# Patient Record
Sex: MALE | Race: BLACK OR AFRICAN AMERICAN | Employment: OTHER | URBAN - METROPOLITAN AREA
[De-identification: names, ages, dates, MRNs, and addresses within clinical notes are randomized per-mention and may not be internally consistent; named-entity substitution may affect disease eponyms.]

---

## 2018-01-02 ENCOUNTER — HOSPITAL ENCOUNTER (EMERGENCY)
Age: 68
Discharge: HOME | End: 2018-01-02
Attending: EMERGENCY MEDICINE
Payer: MEDICARE

## 2018-01-02 VITALS
SYSTOLIC BLOOD PRESSURE: 184 MMHG | BODY MASS INDEX: 27.63 KG/M2 | WEIGHT: 204 LBS | RESPIRATION RATE: 16 BRPM | HEART RATE: 98 BPM | TEMPERATURE: 98 F | HEIGHT: 72 IN | OXYGEN SATURATION: 95 % | DIASTOLIC BLOOD PRESSURE: 102 MMHG

## 2018-01-02 DIAGNOSIS — L73.2 HIDRADENITIS SUPPURATIVA: Primary | ICD-10-CM

## 2018-01-02 PROCEDURE — 99283 EMERGENCY DEPT VISIT LOW MDM: CPT

## 2018-01-02 PROCEDURE — 74011250637 HC RX REV CODE- 250/637: Performed by: EMERGENCY MEDICINE

## 2018-01-02 RX ORDER — CARVEDILOL 12.5 MG/1
12.5 TABLET ORAL 2 TIMES DAILY WITH MEALS
COMMUNITY

## 2018-01-02 RX ORDER — CEPHALEXIN 500 MG/1
500 CAPSULE ORAL
Status: COMPLETED | OUTPATIENT
Start: 2018-01-02 | End: 2018-01-02

## 2018-01-02 RX ORDER — SULFAMETHOXAZOLE AND TRIMETHOPRIM 800; 160 MG/1; MG/1
1 TABLET ORAL 2 TIMES DAILY
Qty: 14 TAB | Refills: 0 | Status: SHIPPED | OUTPATIENT
Start: 2018-01-02 | End: 2018-01-09

## 2018-01-02 RX ORDER — AMLODIPINE BESYLATE 10 MG/1
TABLET ORAL DAILY
COMMUNITY

## 2018-01-02 RX ORDER — METFORMIN HYDROCHLORIDE 500 MG/1
500 TABLET ORAL 2 TIMES DAILY WITH MEALS
COMMUNITY

## 2018-01-02 RX ORDER — GABAPENTIN 300 MG/1
300 CAPSULE ORAL DAILY
COMMUNITY

## 2018-01-02 RX ORDER — SULFAMETHOXAZOLE AND TRIMETHOPRIM 800; 160 MG/1; MG/1
1 TABLET ORAL
Status: COMPLETED | OUTPATIENT
Start: 2018-01-02 | End: 2018-01-02

## 2018-01-02 RX ORDER — LISINOPRIL 10 MG/1
30 TABLET ORAL DAILY
COMMUNITY
End: 2020-05-30

## 2018-01-02 RX ORDER — ATORVASTATIN CALCIUM 40 MG/1
40 TABLET, FILM COATED ORAL DAILY
COMMUNITY

## 2018-01-02 RX ORDER — CEPHALEXIN 500 MG/1
500 CAPSULE ORAL 4 TIMES DAILY
Qty: 28 CAP | Refills: 0 | Status: SHIPPED | OUTPATIENT
Start: 2018-01-02 | End: 2018-01-09

## 2018-01-02 RX ORDER — AMLODIPINE BESYLATE 10 MG/1
10 TABLET ORAL DAILY
Status: ON HOLD | COMMUNITY
End: 2020-05-25

## 2018-01-02 RX ORDER — LABETALOL 200 MG/1
200 TABLET, FILM COATED ORAL 2 TIMES DAILY
COMMUNITY

## 2018-01-02 RX ADMIN — CEPHALEXIN 500 MG: 500 CAPSULE ORAL at 07:39

## 2018-01-02 RX ADMIN — SULFAMETHOXAZOLE AND TRIMETHOPRIM 1 TABLET: 800; 160 TABLET ORAL at 07:39

## 2018-01-02 NOTE — DISCHARGE INSTRUCTIONS
Hidradenitis Suppurativa: Care Instructions  Your Care Instructions    Hidradenitis suppurativa (say \"eyc-glln-gw-NY-tus sup-ange-uh-TY-vuh\") is a skin condition that causes lumps on the skin that look like pimples or boils. The lumps are usually painful and can break open and drain blood and bad-smelling pus. The condition can come and go for many years. Treatment for this condition may includeantibiotics and other medicines. You may need surgeryto remove the lumps. Home care includes wearing loose-fitting clothes and washing the area gently. You can help prevent lumps from coming back by staying at a healthy weight and not smoking. Doctors don't know exactly how this condition starts. But they do know that something irritates and inflames the hair follicles, causing them to swell and form lumps. This skin condition can't be spread from person to person (isn't contagious). Follow-up care is a key part of your treatment and safety. Be sure to make and go to all appointments, and call your doctor if you are having problems. It's also a good idea to know your test results and keep a list of the medicines you take. How can you care for yourself at home? ?Skin care  ? · Wash the area every day with mild soap. Use your hands rather than a washcloth or sponge when you wash that part of your body. ? · Leave the affected areas uncovered when you can. If you have lumps that are draining, you can cover them with a bandage or other dressing. Put petroleum jelly (such as Vaseline) on the dressing to help keep it from sticking. ? · Wear-loose fitting clothes that don't rub against the area. Avoid activities that cause skin to rub together. ? · If you have pain, try a warm compress. Soak a towel or washcloth in warm water, wring it out, and place it on the affected skin for about 10 minutes. Medicines  ? · Be safe with medicines. Take your medicines exactly as prescribed.  Call your doctor if you think you are having a problem with your medicine. You will get more details on the specific medicines your doctor prescribes. ? · If your doctor prescribed antibiotics, take them as directed. Do not stop taking them just because you feel better. You need to take the full course of antibiotics. ? Lifestyle choices  ? · If you smoke, think about quitting. Smoking can make the condition worse. If you need help quitting, talk to your doctor about stop-smoking programs and medicines. These can increase your chances of quitting for good. ? · Stay at a healthy weight, or lose weight, by eating healthy foods and being physically active. Being overweight could make this condition worse. When should you call for help? Call your doctor now or seek immediate medical care if:  ? · You have symptoms of infection, such as:  ¨ Increased pain, swelling, warmth, or redness. ¨ Red streaks leading from the area. ¨ Pus draining from the area. ¨ A fever. ? Watch closely for changes in your health, and be sure to contact your doctor if:  ? · You do not get better as expected. Where can you learn more? Go to http://serafin-josé.info/. Enter O295 in the search box to learn more about \"Hidradenitis Suppurativa: Care Instructions. \"  Current as of: October 13, 2016  Content Version: 11.4  © 1002-0119 [employer]. Care instructions adapted under license by [employer] (which disclaims liability or warranty for this information). If you have questions about a medical condition or this instruction, always ask your healthcare professional. [place] [address] any warranty or liability for your use of this information.

## 2018-01-02 NOTE — ED PROVIDER NOTES
HPI Comments: Pt c/o 15 days of areas of skin swelling in the R axilla x 15 days. He is a diabetic. Also has some sort of \"heart failure\" for which he takes xarelto. He took his am meds about an hour ago. Evaluated at 6:15 am on a Tuesday. No loss of appetite. No CP. No nvdc. No fever/chills. A little pain with palpation of axilla. No pain at rest.  Has hx of draining from these areas but not recently. No pneed for prior I&D. L axilla is fine. Old chart reviewed: Was seen in 2014 for a foot puncture at [facility]. Patient is a 79 y.o. male presenting with skin problem. Skin Problem           Past Medical History:   Diagnosis Date    Diabetes ([address]     Hypertension        Past Surgical History:   Procedure Laterality Date    HX OTHER SURGICAL      cyst removal to back         No family history on file. Social History     Social History    Marital status: SINGLE     Spouse name: N/A    Number of children: N/A    Years of education: N/A     Occupational History    Not on file. Social History Main Topics    Smoking status: Never Smoker    Smokeless tobacco: Never Used    Alcohol use No    Drug use: No    Sexual activity: Not on file     Other Topics Concern    Not on file     Social History Narrative         ALLERGIES: Codeine    Review of Systems    Vitals:    01/02/18 0607   BP: (!) 184/102   Pulse: 98   Resp: 16   Temp: 98 °F (36.7 °C)   SpO2: 95%   Weight: 92.5 kg (204 lb)   Height: 6' (1.829 m)            Physical Exam   Constitutional: He appears well-developed and well-nourished. No distress. Eyes: Pupils are equal, round, and reactive to light. Conj pale   Neck: No tracheal deviation present. Cardiovascular: Normal rate, regular rhythm and intact distal pulses. Pulmonary/Chest: Effort normal.   Abdominal: Soft. He exhibits no distension. There is no tenderness. There is no rebound. Musculoskeletal: He exhibits no edema.    R axilla:  Looks like scars from hidradenitis. Multiple small pustules. Nothing inflamed. Nothing fluctuant. Nothing to drain. No cellulitis. Neurological: He is alert. Skin: Skin is warm and dry. He is not diaphoretic. Psychiatric: He has a normal mood and affect. [facility]  ED Course       Procedures               Plan: oral abx  Surgical referral  No need for I&D at this time.

## 2018-01-02 NOTE — ED TRIAGE NOTES
Patient presents to the emergency department reporting \"boils\" under his right arm. Patient states he thought they went away, but they came back. Patient reports he is diabetic. Patient reports he has noticed these \"boils\" for about two weeks.

## 2018-11-14 ENCOUNTER — HOSPITAL ENCOUNTER (EMERGENCY)
Age: 68
Discharge: HOME | End: 2018-11-14
Attending: EMERGENCY MEDICINE | Admitting: EMERGENCY MEDICINE
Payer: MEDICARE

## 2018-11-14 VITALS
DIASTOLIC BLOOD PRESSURE: 116 MMHG | WEIGHT: 212 LBS | RESPIRATION RATE: 18 BRPM | HEIGHT: 72 IN | OXYGEN SATURATION: 98 % | TEMPERATURE: 98.1 F | BODY MASS INDEX: 28.71 KG/M2 | HEART RATE: 87 BPM | SYSTOLIC BLOOD PRESSURE: 210 MMHG

## 2018-11-14 DIAGNOSIS — S05.01XA ABRASION OF RIGHT CORNEA, INITIAL ENCOUNTER: ICD-10-CM

## 2018-11-14 DIAGNOSIS — I10 ESSENTIAL HYPERTENSION: ICD-10-CM

## 2018-11-14 DIAGNOSIS — H57.11 ACUTE RIGHT EYE PAIN: Primary | ICD-10-CM

## 2018-11-14 PROCEDURE — 74011000250 HC RX REV CODE- 250: Performed by: EMERGENCY MEDICINE

## 2018-11-14 PROCEDURE — 74011250637 HC RX REV CODE- 250/637: Performed by: EMERGENCY MEDICINE

## 2018-11-14 PROCEDURE — 99283 EMERGENCY DEPT VISIT LOW MDM: CPT

## 2018-11-14 RX ORDER — ERYTHROMYCIN 5 MG/G
OINTMENT OPHTHALMIC
Status: COMPLETED | OUTPATIENT
Start: 2018-11-14 | End: 2018-11-14

## 2018-11-14 RX ORDER — TETRACAINE HYDROCHLORIDE 5 MG/ML
1 SOLUTION OPHTHALMIC
Status: COMPLETED | OUTPATIENT
Start: 2018-11-14 | End: 2018-11-14

## 2018-11-14 RX ADMIN — FLUORESCEIN SODIUM 1 STRIP: 0.6 STRIP OPHTHALMIC at 09:05

## 2018-11-14 RX ADMIN — ERYTHROMYCIN: 5 OINTMENT OPHTHALMIC at 09:34

## 2018-11-14 RX ADMIN — TETRACAINE HYDROCHLORIDE 1 DROP: 5 SOLUTION OPHTHALMIC at 09:05

## 2018-11-14 NOTE — ED PROVIDER NOTES
EMERGENCY DEPARTMENT HISTORY AND PHYSICAL EXAM 
 
 
Date: 11/14/2018 Patient Name: Magdalena Nickerson History of Presenting Illness Chief Complaint Patient presents with  Eye Pain  
  pt c/o rt eye pain x 2 weeks,reported might be something in my eye but not sure,pt BP high 212/115,ED provider Dr Hussein Finley made aware,pt taking BP meds,didn't took today. History Provided By: Patient HPI: Magdalena Nickerson, 76 y.o. male with PMHx significant for diabetes, HTN, presents ambulatory to the ED with cc of moderate, progressive intermittent  R eye pain for 2 weeks with associated visual disturbance. Pt reports using a cloth with a closed pin attached ~ 2 weeks ago to wipe his eye and is concern closed pin may have scratched his eye. He also mentions increased tears since onset of symptoms. He reports hx of seeking tx from optometrist, Dr. Aquilino Hsu for corrective lenses, but denies any recent eye assessments. He conveys compliance of medication regimen, but denies endorsing BP medication this morning. He denies endorsing any medication to relieve current symptoms. Pt denies any exacerbating and alleviating factors. Pt specifically denies any signs of fever, chills, N/V/D, HA, weakness, numbness, cough, CP, SOB, abdominal pain, back pain, and any other associated symptoms. There are no other complaints, changes, or physical findings at this time. PCP: Bal Hughes MD 
 
Current Facility-Administered Medications Medication Dose Route Frequency Provider Last Rate Last Dose  erythromycin (ILOTYCIN) 5 mg/gram (0.5 %) ophthalmic ointment   Right Eye NOW Ellie Wiggins, DO      
 
Current Outpatient Medications Medication Sig Dispense Refill  labetalol (NORMODYNE) 200 mg tablet Take 200 mg by mouth two (2) times a day.  carvedilol (COREG) 12.5 mg tablet Take 12.5 mg by mouth two (2) times daily (with meals).  amLODIPine (NORVASC) 10 mg tablet Take 10 mg by mouth daily.  amLODIPine (NORVASC) 10 mg tablet Take  by mouth daily.  gabapentin (NEURONTIN) 300 mg capsule Take 300 mg by mouth daily.  lisinopril (PRINIVIL, ZESTRIL) 10 mg tablet Take 5 mg by mouth daily.  atorvastatin (LIPITOR) 40 mg tablet Take 40 mg by mouth daily.  rivaroxaban (XARELTO) 20 mg tab tablet Take 20 mg by mouth daily.  metFORMIN (GLUCOPHAGE) 500 mg tablet Take 500 mg by mouth two (2) times daily (with meals).  acetaminophen (TYLENOL EXTRA STRENGTH) 500 mg tablet Take 1 Tab by mouth every six (6) hours as needed for Pain. 20 Tab 0  METOPROLOL TARTRATE PO Take  by mouth two (2) times a day. Indications: Unknown dosage Past History Past Medical History: 
Past Medical History:  
Diagnosis Date  Diabetes ([address]  Hypertension Past Surgical History: 
Past Surgical History:  
Procedure Laterality Date  HX OTHER SURGICAL    
 cyst removal to back Family History: 
History reviewed. No pertinent family history. Social History: 
Social History Tobacco Use  Smoking status: Never Smoker  Smokeless tobacco: Never Used Substance Use Topics  Alcohol use: No  
 Drug use: No  
 
 
Allergies: Allergies Allergen Reactions  Codeine Other (comments) \"cause confusion and seeing double\" Review of Systems Review of Systems Constitutional: Negative for chills and fever. HENT: Negative for congestion and sore throat. Eyes: Positive for pain and visual disturbance. Respiratory: Negative for cough and shortness of breath. Cardiovascular: Negative for chest pain and leg swelling. Gastrointestinal: Negative for abdominal pain, blood in stool, diarrhea and nausea. Endocrine: Negative for polyuria. Genitourinary: Negative for dysuria and testicular pain. Musculoskeletal: Negative for arthralgias, joint swelling and myalgias. Skin: Negative for rash. Allergic/Immunologic: Negative for immunocompromised state. Neurological: Negative for weakness and headaches. Hematological: Does not bruise/bleed easily. Psychiatric/Behavioral: Negative for confusion. Physical Exam  
Physical Exam  
Constitutional: He is oriented to person, place, and time. He appears well-developed and well-nourished. HENT:  
Head: Normocephalic and atraumatic. Moist mucous membranes Eyes: Conjunctivae are normal. Pupils are equal, round, and reactive to light. Lids are everted and swept, no foreign bodies found. Right eye exhibits no discharge. No foreign body present in the right eye. Left eye exhibits no discharge. Slit lamp exam: The right eye shows fluorescein uptake. The right eye shows no foreign body. Fluorescein dye uptake running horizontally and in front of visual axis. Pressure values L eye: 16, 25,21,19 Pressure values R eye: 20,21,15 Neck: Normal range of motion. Neck supple. No tracheal deviation present. Cardiovascular: Normal rate, regular rhythm and normal heart sounds. No murmur heard. Pulmonary/Chest: Effort normal and breath sounds normal. No respiratory distress. He has no wheezes. He has no rales. Abdominal: Soft. Bowel sounds are normal. There is no tenderness. There is no rebound and no guarding. Musculoskeletal: Normal range of motion. He exhibits no edema, tenderness or deformity. Neurological: He is alert and oriented to person, place, and time. Skin: Skin is warm and dry. No rash noted. No erythema. Psychiatric: His behavior is normal.  
Nursing note and vitals reviewed. Diagnostic Study Results Labs - No results found for this or any previous visit (from the past 12 hour(s)). Radiologic Studies - None Medical Decision Making I am the first provider for this patient. I reviewed the vital signs, available nursing notes, past medical history, past surgical history, family history and social history. Vital Signs-Reviewed the patient's vital signs. Patient Vitals for the past 12 hrs: 
 Temp Pulse Resp BP SpO2  
11/14/18 0849 98.1 °F (36.7 °C) 87 18 (!) 212/115 98 % Pulse Oximetry Analysis - 98% on RA Records Reviewed: Nursing Notes, Old Medical Records, Previous Radiology Studies and Previous Laboratory Studies Provider Notes (Medical Decision Making): DDx: Corneal abrasion, corneal ulcer. No evidence of globe rupture or glaucoma. ED Course:  
Initial assessment performed. The patients presenting problems have been discussed, and they are in agreement with the care plan formulated and outlined with them. I have encouraged them to ask questions as they arise throughout their visit. Progress Note: 
9:17 AM 
Pt will f/u with Dr. Minda Alvarez this morning for a close f/u appointment. Written by DALE Dowibe, as dictated by Randy Galvez DO. Progress Note: 
9:27 AM 
Pt reports he did not take BP medication today. He further states he needs to go home and take medication. Written by DALE Dow, as dictated by Randy Galvez DO. Critical Care Time:  
0 minutes Disposition: 
Discharge Note: 
9:33 AM 
The pt is ready for discharge. The pt's signs, symptoms, diagnosis, and discharge instructions have been discussed and pt has conveyed their understanding. The pt is to follow up as recommended or return to ER should their symptoms worsen. Plan has been discussed and pt is in agreement. PLAN: 
1. Current Discharge Medication List  
  
 
2. Follow-up Information Follow up With Specialties Details Why Contact Info Shona Betancur MD Ophthalmology Call today for next available appointment. Let them know you were discharged from the ED with right eye pain [address] [place] [address] 
[phone] Afia Heath MD Internal Medicine Schedule an appointment as soon as possible for a visit regarding your blood pressure.  please make sure you take your blood pressure medicines. [address] [address] 
[phone] Return to ED if worse Diagnosis Clinical Impression: 1. Acute right eye pain 2. Abrasion of right cornea, initial encounter 3. Essential hypertension Attestations: This note is prepared by Maria D Polanco, acting as Scribe for [employer] Data CorporationDO. [employer], DO: The scribe's documentation has been prepared under my direction and personally reviewed by me in its entirety. I confirm that the note above accurately reflects all work, treatment, procedures, and medical decision making performed by me

## 2018-11-14 NOTE — ED NOTES
Patient given printed discharge instructions and np script(s). Pt verbalized understanding of instructions and script(s). Patient verbalized importance of following up with eye doctor and pcp. Patient alert and oriented, in no acute distress, ambulatory with self.

## 2018-11-14 NOTE — ED NOTES
Emergency Department Nursing Plan of Care The Nursing Plan of Care is developed from the Nursing assessment and Emergency Department Attending provider initial evaluation. The plan of care may be reviewed in the ED Provider note. The Plan of Care was developed with the following considerations:  
Patient / Family readiness to learn indicated by:verbalized understanding Persons(s) to be included in education: patient Barriers to Learning/Limitations:No 
 
Signed Jessie Armando 11/14/2018   8:57 AM

## 2018-11-14 NOTE — DISCHARGE INSTRUCTIONS
Please go take your blood pressure medicines as soon as possible. High Blood Pressure: Care Instructions  Your Care Instructions    If your blood pressure is usually above 130/80, you have high blood pressure, or hypertension. That means the top number is 130 or higher or the bottom number is 80 or higher, or both. Despite what a lot of people think, high blood pressure usually doesn't cause headaches or make you feel dizzy or lightheaded. It usually has no symptoms. But it does increase your risk for heart attack, stroke, and kidney or eye damage. The higher your blood pressure, the more your risk increases. Your doctor will give you a goal for your blood pressure. Your goal will be based on your health and your age. Lifestyle changes, such as eating healthy and being active, are always important to help lower blood pressure. You might also take medicine to reach your blood pressure goal.  Follow-up care is a key part of your treatment and safety. Be sure to make and go to all appointments, and call your doctor if you are having problems. It's also a good idea to know your test results and keep a list of the medicines you take. How can you care for yourself at home? Medical treatment  · If you stop taking your medicine, your blood pressure will go back up. You may take one or more types of medicine to lower your blood pressure. Be safe with medicines. Take your medicine exactly as prescribed. Call your doctor if you think you are having a problem with your medicine. · Talk to your doctor before you start taking aspirin every day. Aspirin can help certain people lower their risk of a heart attack or stroke. But taking aspirin isn't right for everyone, because it can cause serious bleeding. · See your doctor regularly. You may need to see the doctor more often at first or until your blood pressure comes down.   · If you are taking blood pressure medicine, talk to your doctor before you take decongestants or anti-inflammatory medicine, such as ibuprofen. Some of these medicines can raise blood pressure. · Learn how to check your blood pressure at home. Lifestyle changes  · Stay at a healthy weight. This is especially important if you put on weight around the waist. Losing even 10 pounds can help you lower your blood pressure. · If your doctor recommends it, get more exercise. Walking is a good choice. Bit by bit, increase the amount you walk every day. Try for at least 30 minutes on most days of the week. You also may want to swim, bike, or do other activities. · Avoid or limit alcohol. Talk to your doctor about whether you can drink any alcohol. · Try to limit how much sodium you eat to less than 2,300 milligrams (mg) a day. Your doctor may ask you to try to eat less than 1,500 mg a day. · Eat plenty of fruits (such as bananas and oranges), vegetables, legumes, whole grains, and low-fat dairy products. · Lower the amount of saturated fat in your diet. Saturated fat is found in animal products such as milk, cheese, and meat. Limiting these foods may help you lose weight and also lower your risk for heart disease. · Do not smoke. Smoking increases your risk for heart attack and stroke. If you need help quitting, talk to your doctor about stop-smoking programs and medicines. These can increase your chances of quitting for good. When should you call for help? Call 911 anytime you think you may need emergency care. This may mean having symptoms that suggest that your blood pressure is causing a serious heart or blood vessel problem. Your blood pressure may be over 180/120.   For example, call 911 if:    · You have symptoms of a heart attack. These may include:  ? Chest pain or pressure, or a strange feeling in the chest.  ? Sweating. ? Shortness of breath. ? Nausea or vomiting.   ? Pain, pressure, or a strange feeling in the back, neck, jaw, or upper belly or in one or both shoulders or arms.  ? Lightheadedness or sudden weakness. ? A fast or irregular heartbeat.     · You have symptoms of a stroke. These may include:  ? Sudden numbness, tingling, weakness, or loss of movement in your face, arm, or leg, especially on only one side of your body. ? Sudden vision changes. ? Sudden trouble speaking. ? Sudden confusion or trouble understanding simple statements. ? Sudden problems with walking or balance. ? A sudden, severe headache that is different from past headaches.     · You have severe back or belly pain.    Do not wait until your blood pressure comes down on its own. Get help right away.   Call your doctor now or seek immediate care if:    · Your blood pressure is much higher than normal (such as 180/120 or higher), but you don't have symptoms.     · You think high blood pressure is causing symptoms, such as:  ? Severe headache.  ? Blurry vision.    Watch closely for changes in your health, and be sure to contact your doctor if:    · Your blood pressure measures higher than your doctor recommends at least 2 times. That means the top number is higher or the bottom number is higher, or both.     · You think you may be having side effects from your blood pressure medicine. Where can you learn more? Go to http://serafin-josé.info/. Enter Q449 in the search box to learn more about \"High Blood Pressure: Care Instructions. \"  Current as of: December 6, 2017  Content Version: 11.8  © 0635-1816 [employer]. Care instructions adapted under license by [employer] (which disclaims liability or warranty for this information). If you have questions about a medical condition or this instruction, always ask your healthcare professional. [place] [address] any warranty or liability for your use of this information.            Corneal Scratches: Care Instructions  Your Care Instructions    The cornea is the clear surface that covers the front of the eye. When a speck of dirt, a wood chip, an insect, or another object flies into your eye, it can cause a painful scratch on the cornea. Wearing contact lenses too long or rubbing your eyes can also scratch the cornea. Small scratches usually heal in a day or two. Deeper scratches may take longer. If you have had a foreign object removed from your eye or you have a corneal scratch, you will need to watch for infection and vision problems while your eye heals. Follow-up care is a key part of your treatment and safety. Be sure to make and go to all appointments, and call your doctor if you are having problems. It's also a good idea to know your test results and keep a list of the medicines you take. How can you care for yourself at home? · The doctor probably used a medicine during your exam to numb your eye. When it wears off in 30 to 60 minutes, your eye pain may come back. Take pain medicines exactly as directed. ? If the doctor gave you a prescription medicine for pain, take it as prescribed. ? If you are not taking a prescription pain medicine, ask your doctor if you can take an over-the-counter medicine. ? Do not take two or more pain medicines at the same time unless the doctor told you to. Many pain medicines have acetaminophen, which is Tylenol. Too much acetaminophen (Tylenol) can be harmful. · Do not rub your injured eye. Rubbing can make it worse. · Use the prescribed eyedrops or ointment as directed. Be sure the dropper or bottle tip is clean. To put in eyedrops or ointment:  ? Tilt your head back, and pull your lower eyelid down with one finger. ? Drop or squirt the medicine inside the lower lid. ? Close your eye for 30 to 60 seconds to let the drops or ointment move around. ? Do not touch the ointment or dropper tip to your eyelashes or any other surface. · Do not use your contact lens in your hurt eye until your doctor says you can.  Also, do not wear eye makeup until your eye has healed. · Do not drive if you have blurred vision. · Bright light may hurt. Sunglasses can help. · To prevent eye injuries in the future, wear safety glasses or goggles when you work with machines or tools, mow the lawn, or ride a bike or motorcycle. When should you call for help? Call your doctor now or seek immediate medical care if:    · You have signs of an eye infection, such as:  ? Pus or thick discharge coming from the eye.  ? Redness or swelling around the eye.  ? A fever.     · You have new or worse eye pain.     · You have vision changes.     · It feels like there is something in your eye.     · Light hurts your eye.    Watch closely for changes in your health, and be sure to contact your doctor if:    · You do not get better as expected. Where can you learn more? Go to http://esrafin-josé.info/. Enter H662 in the search box to learn more about \"Corneal Scratches: Care Instructions. \"  Current as of: December 3, 2017  Content Version: 11.8  © 6034-6668 [employer]. Care instructions adapted under license by [employer] (which disclaims liability or warranty for this information). If you have questions about a medical condition or this instruction, always ask your healthcare professional. [place] [address] any warranty or liability for your use of this information.

## 2018-11-14 NOTE — ED NOTES
Provider notified of pt's high bp. Provider informed pt that he is to go home and take his normal daily meds.

## 2018-12-14 ENCOUNTER — HOSPITAL ENCOUNTER (EMERGENCY)
Age: 68
Discharge: HOME | End: 2018-12-14
Attending: EMERGENCY MEDICINE
Payer: MEDICARE

## 2018-12-14 VITALS
OXYGEN SATURATION: 100 % | DIASTOLIC BLOOD PRESSURE: 117 MMHG | SYSTOLIC BLOOD PRESSURE: 185 MMHG | RESPIRATION RATE: 22 BRPM | HEART RATE: 99 BPM | TEMPERATURE: 98.3 F

## 2018-12-14 DIAGNOSIS — M54.31 SCIATICA OF RIGHT SIDE: Primary | ICD-10-CM

## 2018-12-14 PROCEDURE — 99281 EMR DPT VST MAYX REQ PHY/QHP: CPT

## 2018-12-14 RX ORDER — CYCLOBENZAPRINE HCL 10 MG
10 TABLET ORAL
Qty: 15 TAB | Refills: 0 | Status: SHIPPED | OUTPATIENT
Start: 2018-12-14

## 2018-12-14 RX ORDER — PREDNISONE 20 MG/1
60 TABLET ORAL DAILY
Qty: 15 TAB | Refills: 0 | Status: SHIPPED | OUTPATIENT
Start: 2018-12-14 | End: 2018-12-19

## 2018-12-14 NOTE — ED PROVIDER NOTES
65yo M with pain. Pain in mostly in R lower back and radiates down R leg. Pain present for 5 days. No fall or injury. AT home pt just tried to find comfortable position. No pain medication. Was at [facility] 2 days ago. Gave patient pain pills. Has had symptoms similar to this for long time. Past Medical History:   Diagnosis Date    Diabetes ([facility] [address]     Hypertension        Past Surgical History:   Procedure Laterality Date    HX OTHER SURGICAL      cyst removal to back         No family history on file. Social History     Socioeconomic History    Marital status: SINGLE     Spouse name: Not on file    Number of children: Not on file    Years of education: Not on file    Highest education level: Not on file   Social Needs    Financial resource strain: Not on file    Food insecurity - worry: Not on file    Food insecurity - inability: Not on file    Transportation needs - medical: Not on file   [place] needs - non-medical: Not on file   Occupational History    Not on file   Tobacco Use    Smoking status: Never Smoker    Smokeless tobacco: Never Used   Substance and Sexual Activity    Alcohol use: No    Drug use: No    Sexual activity: Not on file   Other Topics Concern    Not on file   Social History Narrative    Not on file         ALLERGIES: Codeine    Review of Systems   Constitutional: Negative for diaphoresis and fever. HENT: Negative for facial swelling. Eyes: Negative for visual disturbance. Respiratory: Negative for cough. Cardiovascular: Negative for chest pain. Gastrointestinal: Negative for abdominal pain. Genitourinary: Negative for dysuria. Musculoskeletal: Negative for joint swelling. Skin: Negative for rash. Neurological: Negative for headaches. Hematological: Negative for adenopathy. Psychiatric/Behavioral: Negative for suicidal ideas.        Vitals:    12/14/18 1427   Pulse: 99   SpO2: 100%            Physical Exam   Constitutional: He is oriented to person, place, and time. He appears well-developed and well-nourished. No distress. HENT:   Head: Normocephalic and atraumatic. Mouth/Throat: Oropharynx is clear and moist.   Eyes: Pupils are equal, round, and reactive to light. Neck: Normal range of motion. Neck supple. Cardiovascular: Normal rate, regular rhythm, normal heart sounds and intact distal pulses. Pulmonary/Chest: Effort normal and breath sounds normal. No respiratory distress. Abdominal: Soft. Bowel sounds are normal. He exhibits no distension. There is no tenderness. Musculoskeletal: Normal range of motion. He exhibits no edema. Neurological: He is alert and oriented to person, place, and time. Skin: Skin is warm and dry. Nursing note and vitals reviewed. MDM  Number of Diagnoses or Management Options  Sciatica of right side:   Diagnosis management comments: A:  Symptoms suggestive of sciatica. VS stable. Has prescription for pain medication from [facility] that he hasn't filled. Stable for discharge with prednisone and flexeril.          Procedures

## 2018-12-14 NOTE — DISCHARGE INSTRUCTIONS
Back Pain: Care Instructions  Your Care Instructions    Back pain has many possible causes. It is often related to problems with muscles and ligaments of the back. It may also be related to problems with the nerves, discs, or bones of the back. Moving, lifting, standing, sitting, or sleeping in an awkward way can strain the back. Sometimes you don't notice the injury until later. Arthritis is another common cause of back pain. Although it may hurt a lot, back pain usually improves on its own within several weeks. Most people recover in 12 weeks or less. Using good home treatment and being careful not to stress your back can help you feel better sooner. Follow-up care is a key part of your treatment and safety. Be sure to make and go to all appointments, and call your doctor if you are having problems. It's also a good idea to know your test results and keep a list of the medicines you take. How can you care for yourself at home? · Sit or lie in positions that are most comfortable and reduce your pain. Try one of these positions when you lie down:  ? Lie on your back with your knees bent and supported by large pillows. ? Lie on the floor with your legs on the seat of a sofa or chair. ? Lie on your side with your knees and hips bent and a pillow between your legs. ? Lie on your stomach if it does not make pain worse. · Do not sit up in bed, and avoid soft couches and twisted positions. Bed rest can help relieve pain at first, but it delays healing. Avoid bed rest after the first day of back pain. · Change positions every 30 minutes. If you must sit for long periods of time, take breaks from sitting. Get up and walk around, or lie in a comfortable position. · Try using a heating pad on a low or medium setting for 15 to 20 minutes every 2 or 3 hours. Try a warm shower in place of one session with the heating pad. · You can also try an ice pack for 10 to 15 minutes every 2 to 3 hours.  Put a thin cloth between the ice pack and your skin. · Take pain medicines exactly as directed. ? If the doctor gave you a prescription medicine for pain, take it as prescribed. ? If you are not taking a prescription pain medicine, ask your doctor if you can take an over-the-counter medicine. · Take short walks several times a day. You can start with 5 to 10 minutes, 3 or 4 times a day, and work up to longer walks. Walk on level surfaces and avoid hills and stairs until your back is better. · Return to work and other activities as soon as you can. Continued rest without activity is usually not good for your back. · To prevent future back pain, do exercises to stretch and strengthen your back and stomach. Learn how to use good posture, safe lifting techniques, and proper body mechanics. When should you call for help? Call your doctor now or seek immediate medical care if:    · You have new or worsening numbness in your legs.     · You have new or worsening weakness in your legs. (This could make it hard to stand up.)     · You lose control of your bladder or bowels.    Watch closely for changes in your health, and be sure to contact your doctor if:    · You have a fever, lose weight, or don't feel well.     · You do not get better as expected. Where can you learn more? Go to http://serafin-josé.info/. Enter B742 in the search box to learn more about \"Back Pain: Care Instructions. \"  Current as of: November 29, 2017  Content Version: 11.8  © 2371-0403 [employer]. Care instructions adapted under license by [employer] (which disclaims liability or warranty for this information). If you have questions about a medical condition or this instruction, always ask your healthcare professional. [address] any warranty or liability for your use of this information.            Sciatica: Care Instructions  Your Care Instructions    Sciatica (say \"pgz-SH-ce-kuh\") is an irritation of one of the sciatic nerves, which come from the spinal cord in the lower back. The sciatic nerves and their branches extend down through the buttock to the foot. Sciatica can develop when an injured disc in the back presses against a spinal nerve root. Its main symptom is pain, numbness, or weakness that is often worse in the leg or foot than in the back. Sciatica often will improve and go away with time. Early treatment usually includes medicines and exercises to relieve pain. Follow-up care is a key part of your treatment and safety. Be sure to make and go to all appointments, and call your doctor if you are having problems. It's also a good idea to know your test results and keep a list of the medicines you take. How can you care for yourself at home? · Take pain medicines exactly as directed. ? If the doctor gave you a prescription medicine for pain, take it as prescribed. ? If you are not taking a prescription pain medicine, ask your doctor if you can take an over-the-counter medicine. · Use heat or ice to relieve pain. ? To apply heat, put a warm water bottle, heating pad set on low, or warm cloth on your back. Do not go to sleep with a heating pad on your skin. ? To use ice, put ice or a cold pack on the area for 10 to 20 minutes at a time. Put a thin cloth between the ice and your skin. · Avoid sitting if possible, unless it feels better than standing. · Alternate lying down with short walks. Increase your walking distance as you are able to without making your symptoms worse. · Do not do anything that makes your symptoms worse. When should you call for help? Call 911 anytime you think you may need emergency care. For example, call if:    · You are unable to move a leg at all.   [facility] your doctor now or seek immediate medical care if:    · You have new or worse symptoms in your legs or buttocks. Symptoms may include:  ? Numbness or tingling. ? Weakness.   ? Pain.     · You lose bladder or bowel control.    Watch closely for changes in your health, and be sure to contact your doctor if:    · You are not getting better as expected. Where can you learn more? Go to http://serafin-josé.info/. Enter [phone] in the search box to learn more about \"Sciatica: Care Instructions. \"  Current as of: November 29, 2017  Content Version: 11.8  © 6527-8858 [employer]. Care instructions adapted under license by [employer] (which disclaims liability or warranty for this information). If you have questions about a medical condition or this instruction, always ask your healthcare professional. [address] any warranty or liability for your use of this information.

## 2018-12-14 NOTE — ED TRIAGE NOTES
Triage:  Pt to ED due to unrelieved lower back pain with radiation down the legs. Pt denies any traumatic injury but does mention increased strain.

## 2020-05-25 ENCOUNTER — HOSPITAL ENCOUNTER (INPATIENT)
Age: 70
LOS: 5 days | Discharge: HOME | DRG: 571 | End: 2020-05-30
Attending: EMERGENCY MEDICINE | Admitting: HOSPITALIST
Payer: MEDICARE

## 2020-05-25 ENCOUNTER — APPOINTMENT (OUTPATIENT)
Dept: GENERAL RADIOLOGY | Age: 70
DRG: 571 | End: 2020-05-25
Attending: PHYSICIAN ASSISTANT
Payer: MEDICARE

## 2020-05-25 DIAGNOSIS — S91.331D PUNCTURE WOUND OF RIGHT FOOT, SUBSEQUENT ENCOUNTER: Primary | ICD-10-CM

## 2020-05-25 PROBLEM — S91.301A OPEN WOUND OF RIGHT FOOT: Status: ACTIVE | Noted: 2020-05-25

## 2020-05-25 LAB
ALBUMIN SERPL-MCNC: 3.8 G/DL (ref 3.5–5)
ALBUMIN/GLOB SERPL: 0.8 {RATIO} (ref 1.1–2.2)
ALP SERPL-CCNC: 120 U/L (ref 45–117)
ALT SERPL-CCNC: 25 U/L (ref 12–78)
ANION GAP SERPL CALC-SCNC: 4 MMOL/L (ref 5–15)
AST SERPL-CCNC: 14 U/L (ref 15–37)
ATRIAL RATE: 63 BPM
BASOPHILS # BLD: 0.1 K/UL (ref 0–0.1)
BASOPHILS NFR BLD: 1 % (ref 0–1)
BILIRUB SERPL-MCNC: 0.6 MG/DL (ref 0.2–1)
BNP SERPL-MCNC: 42 PG/ML
BUN SERPL-MCNC: 14 MG/DL (ref 6–20)
BUN/CREAT SERPL: 15 (ref 12–20)
CALCIUM SERPL-MCNC: 10.1 MG/DL (ref 8.5–10.1)
CALCULATED P AXIS, ECG09: 59 DEGREES
CALCULATED R AXIS, ECG10: 72 DEGREES
CALCULATED T AXIS, ECG11: 47 DEGREES
CHLORIDE SERPL-SCNC: 103 MMOL/L (ref 97–108)
CO2 SERPL-SCNC: 29 MMOL/L (ref 21–32)
COMMENT, HOLDF: NORMAL
CREAT SERPL-MCNC: 0.93 MG/DL (ref 0.7–1.3)
CRP SERPL-MCNC: 6.38 MG/DL (ref 0–0.6)
DIAGNOSIS, 93000: NORMAL
DIFFERENTIAL METHOD BLD: ABNORMAL
EOSINOPHIL # BLD: 1.9 K/UL (ref 0–0.4)
EOSINOPHIL NFR BLD: 16 % (ref 0–7)
ERYTHROCYTE [DISTWIDTH] IN BLOOD BY AUTOMATED COUNT: 11.6 % (ref 11.5–14.5)
ERYTHROCYTE [SEDIMENTATION RATE] IN BLOOD: 56 MM/HR (ref 0–20)
EST. AVERAGE GLUCOSE BLD GHB EST-MCNC: 157 MG/DL
GLOBULIN SER CALC-MCNC: 4.9 G/DL (ref 2–4)
GLUCOSE BLD STRIP.AUTO-MCNC: 133 MG/DL (ref 65–100)
GLUCOSE BLD STRIP.AUTO-MCNC: 133 MG/DL (ref 65–100)
GLUCOSE SERPL-MCNC: 235 MG/DL (ref 65–100)
HBA1C MFR BLD: 7.1 % (ref 4–5.6)
HCT VFR BLD AUTO: 39.4 % (ref 36.6–50.3)
HGB BLD-MCNC: 12.9 G/DL (ref 12.1–17)
IMM GRANULOCYTES # BLD AUTO: 0.1 K/UL
IMM GRANULOCYTES NFR BLD AUTO: 1 %
LACTATE SERPL-SCNC: 0.9 MMOL/L (ref 0.4–2)
LYMPHOCYTES # BLD: 1.9 K/UL (ref 0.8–3.5)
LYMPHOCYTES NFR BLD: 16 % (ref 12–49)
MCH RBC QN AUTO: 28.5 PG (ref 26–34)
MCHC RBC AUTO-ENTMCNC: 32.7 G/DL (ref 30–36.5)
MCV RBC AUTO: 87 FL (ref 80–99)
MONOCYTES # BLD: 0.8 K/UL (ref 0–1)
MONOCYTES NFR BLD: 7 % (ref 5–13)
NEUTS SEG # BLD: 7.2 K/UL (ref 1.8–8)
NEUTS SEG NFR BLD: 59 % (ref 32–75)
NRBC # BLD: 0 K/UL (ref 0–0.01)
NRBC BLD-RTO: 0 PER 100 WBC
P-R INTERVAL, ECG05: 162 MS
PLATELET # BLD AUTO: 425 K/UL (ref 150–400)
PMV BLD AUTO: 9.4 FL (ref 8.9–12.9)
POTASSIUM SERPL-SCNC: 3.7 MMOL/L (ref 3.5–5.1)
PROT SERPL-MCNC: 8.7 G/DL (ref 6.4–8.2)
Q-T INTERVAL, ECG07: 394 MS
QRS DURATION, ECG06: 104 MS
QTC CALCULATION (BEZET), ECG08: 403 MS
RBC # BLD AUTO: 4.53 M/UL (ref 4.1–5.7)
RBC MORPH BLD: ABNORMAL
SAMPLES BEING HELD,HOLD: NORMAL
SERVICE CMNT-IMP: ABNORMAL
SERVICE CMNT-IMP: ABNORMAL
SODIUM SERPL-SCNC: 136 MMOL/L (ref 136–145)
VENTRICULAR RATE, ECG03: 63 BPM
WBC # BLD AUTO: 12 K/UL (ref 4.1–11.1)

## 2020-05-25 PROCEDURE — 74011000258 HC RX REV CODE- 258: Performed by: HOSPITALIST

## 2020-05-25 PROCEDURE — 36415 COLL VENOUS BLD VENIPUNCTURE: CPT

## 2020-05-25 PROCEDURE — 86140 C-REACTIVE PROTEIN: CPT

## 2020-05-25 PROCEDURE — 87205 SMEAR GRAM STAIN: CPT

## 2020-05-25 PROCEDURE — 74011636637 HC RX REV CODE- 636/637: Performed by: HOSPITALIST

## 2020-05-25 PROCEDURE — 83036 HEMOGLOBIN GLYCOSYLATED A1C: CPT

## 2020-05-25 PROCEDURE — 85652 RBC SED RATE AUTOMATED: CPT

## 2020-05-25 PROCEDURE — 96365 THER/PROPH/DIAG IV INF INIT: CPT

## 2020-05-25 PROCEDURE — 74011250637 HC RX REV CODE- 250/637: Performed by: HOSPITALIST

## 2020-05-25 PROCEDURE — 74011000258 HC RX REV CODE- 258: Performed by: PHYSICIAN ASSISTANT

## 2020-05-25 PROCEDURE — 83605 ASSAY OF LACTIC ACID: CPT

## 2020-05-25 PROCEDURE — 85025 COMPLETE CBC W/AUTO DIFF WBC: CPT

## 2020-05-25 PROCEDURE — 73630 X-RAY EXAM OF FOOT: CPT

## 2020-05-25 PROCEDURE — 99284 EMERGENCY DEPT VISIT MOD MDM: CPT

## 2020-05-25 PROCEDURE — 82962 GLUCOSE BLOOD TEST: CPT

## 2020-05-25 PROCEDURE — 74011250636 HC RX REV CODE- 250/636: Performed by: HOSPITALIST

## 2020-05-25 PROCEDURE — 80053 COMPREHEN METABOLIC PANEL: CPT

## 2020-05-25 PROCEDURE — 65270000032 HC RM SEMIPRIVATE

## 2020-05-25 PROCEDURE — 74011250636 HC RX REV CODE- 250/636: Performed by: PHYSICIAN ASSISTANT

## 2020-05-25 PROCEDURE — 83880 ASSAY OF NATRIURETIC PEPTIDE: CPT

## 2020-05-25 PROCEDURE — 87040 BLOOD CULTURE FOR BACTERIA: CPT

## 2020-05-25 PROCEDURE — 93005 ELECTROCARDIOGRAM TRACING: CPT

## 2020-05-25 RX ORDER — INSULIN GLARGINE 100 [IU]/ML
25 INJECTION, SOLUTION SUBCUTANEOUS
Status: DISCONTINUED | OUTPATIENT
Start: 2020-05-25 | End: 2020-05-30 | Stop reason: HOSPADM

## 2020-05-25 RX ORDER — LISINOPRIL 5 MG/1
5 TABLET ORAL DAILY
Status: DISCONTINUED | OUTPATIENT
Start: 2020-05-26 | End: 2020-05-30 | Stop reason: HOSPADM

## 2020-05-25 RX ORDER — AMLODIPINE BESYLATE 5 MG/1
10 TABLET ORAL DAILY
Status: DISCONTINUED | OUTPATIENT
Start: 2020-05-26 | End: 2020-05-30 | Stop reason: HOSPADM

## 2020-05-25 RX ORDER — MAGNESIUM SULFATE 100 %
4 CRYSTALS MISCELLANEOUS AS NEEDED
Status: DISCONTINUED | OUTPATIENT
Start: 2020-05-25 | End: 2020-05-30 | Stop reason: HOSPADM

## 2020-05-25 RX ORDER — ATORVASTATIN CALCIUM 40 MG/1
40 TABLET, FILM COATED ORAL DAILY
Status: DISCONTINUED | OUTPATIENT
Start: 2020-05-26 | End: 2020-05-30 | Stop reason: HOSPADM

## 2020-05-25 RX ORDER — GABAPENTIN 300 MG/1
300 CAPSULE ORAL DAILY
Status: DISCONTINUED | OUTPATIENT
Start: 2020-05-26 | End: 2020-05-25

## 2020-05-25 RX ORDER — SODIUM CHLORIDE 0.9 % (FLUSH) 0.9 %
5-40 SYRINGE (ML) INJECTION EVERY 8 HOURS
Status: DISCONTINUED | OUTPATIENT
Start: 2020-05-25 | End: 2020-05-30 | Stop reason: HOSPADM

## 2020-05-25 RX ORDER — VANCOMYCIN/0.9 % SOD CHLORIDE 1.5G/250ML
1500 PLASTIC BAG, INJECTION (ML) INTRAVENOUS
Status: DISCONTINUED | OUTPATIENT
Start: 2020-05-26 | End: 2020-05-28

## 2020-05-25 RX ORDER — ACETAMINOPHEN 325 MG/1
650 TABLET ORAL
Status: DISCONTINUED | OUTPATIENT
Start: 2020-05-25 | End: 2020-05-27

## 2020-05-25 RX ORDER — LABETALOL 100 MG/1
200 TABLET, FILM COATED ORAL 2 TIMES DAILY
Status: CANCELLED | OUTPATIENT
Start: 2020-05-25

## 2020-05-25 RX ORDER — VANCOMYCIN 2 GRAM/500 ML IN 0.9 % SODIUM CHLORIDE INTRAVENOUS
2000 ONCE
Status: COMPLETED | OUTPATIENT
Start: 2020-05-25 | End: 2020-05-25

## 2020-05-25 RX ORDER — INSULIN LISPRO 100 [IU]/ML
INJECTION, SOLUTION INTRAVENOUS; SUBCUTANEOUS
Status: DISCONTINUED | OUTPATIENT
Start: 2020-05-25 | End: 2020-05-30 | Stop reason: HOSPADM

## 2020-05-25 RX ORDER — OXYCODONE AND ACETAMINOPHEN 5; 325 MG/1; MG/1
1 TABLET ORAL
Status: DISCONTINUED | OUTPATIENT
Start: 2020-05-25 | End: 2020-05-27

## 2020-05-25 RX ORDER — ATORVASTATIN CALCIUM 40 MG/1
40 TABLET, FILM COATED ORAL DAILY
Status: DISCONTINUED | OUTPATIENT
Start: 2020-05-26 | End: 2020-05-25

## 2020-05-25 RX ORDER — CARVEDILOL 12.5 MG/1
12.5 TABLET ORAL 2 TIMES DAILY WITH MEALS
Status: DISCONTINUED | OUTPATIENT
Start: 2020-05-25 | End: 2020-05-30 | Stop reason: HOSPADM

## 2020-05-25 RX ORDER — SODIUM CHLORIDE 0.9 % (FLUSH) 0.9 %
5-40 SYRINGE (ML) INJECTION AS NEEDED
Status: DISCONTINUED | OUTPATIENT
Start: 2020-05-25 | End: 2020-05-30 | Stop reason: HOSPADM

## 2020-05-25 RX ORDER — DEXTROSE 50 % IN WATER (D50W) INTRAVENOUS SYRINGE
12.5-25 AS NEEDED
Status: DISCONTINUED | OUTPATIENT
Start: 2020-05-25 | End: 2020-05-25 | Stop reason: CLARIF

## 2020-05-25 RX ORDER — CYCLOBENZAPRINE HCL 10 MG
10 TABLET ORAL
Status: DISCONTINUED | OUTPATIENT
Start: 2020-05-25 | End: 2020-05-25

## 2020-05-25 RX ADMIN — Medication 10 ML: at 15:29

## 2020-05-25 RX ADMIN — ACETAMINOPHEN 650 MG: 325 TABLET, FILM COATED ORAL at 23:48

## 2020-05-25 RX ADMIN — PIPERACILLIN AND TAZOBACTAM 3.38 G: 3; .375 INJECTION, POWDER, LYOPHILIZED, FOR SOLUTION INTRAVENOUS at 11:02

## 2020-05-25 RX ADMIN — ACETAMINOPHEN 650 MG: 325 TABLET, FILM COATED ORAL at 15:28

## 2020-05-25 RX ADMIN — OXYCODONE HYDROCHLORIDE AND ACETAMINOPHEN 1 TABLET: 5; 325 TABLET ORAL at 21:00

## 2020-05-25 RX ADMIN — INSULIN GLARGINE 25 UNITS: 100 INJECTION, SOLUTION SUBCUTANEOUS at 22:00

## 2020-05-25 RX ADMIN — VANCOMYCIN HYDROCHLORIDE 2000 MG: 10 INJECTION, POWDER, LYOPHILIZED, FOR SOLUTION INTRAVENOUS at 11:54

## 2020-05-25 RX ADMIN — PIPERACILLIN AND TAZOBACTAM 3.38 G: 3; .375 INJECTION, POWDER, FOR SOLUTION INTRAVENOUS at 18:16

## 2020-05-25 RX ADMIN — CARVEDILOL 12.5 MG: 12.5 TABLET, FILM COATED ORAL at 20:29

## 2020-05-25 NOTE — PROGRESS NOTES
Admission Medication Reconciliation: In progress:    Spoke with daughter Duarte Rinaldi) by telephone @ [phone], unable to speak with patient face to face at this time due to general isolation precautions in the ED related to COVID-19 pandemic. She is unable to provide any history. Called patient's room x 3: [phone], first time was busy and no answer past two attempts. Will check back and update if additional information is obtained. Thank you for allowing me to participate in the care of your patient. Noemí Kimball PharmD, RN #[id number] benefit data reflects medications filled and processed through the patient's insurance, however   this data does NOT capture whether the medication was picked up or is currently being taken by the patient. Allergies:  Codeine    Significant PMH/Disease States:   Past Medical History:   Diagnosis Date    Diabetes ([facility] [address]     Hypertension      Chief Complaint for this Admission:    Chief Complaint   Patient presents with    Foot Pain     Prior to Admission Medications:   Prior to Admission Medications   Prescriptions Last Dose Informant Taking? METOPROLOL TARTRATE PO   No   Sig: Take  by mouth two (2) times a day. Indications: Unknown dosage   acetaminophen (TYLENOL EXTRA STRENGTH) 500 mg tablet   Yes   Sig: Take 1 Tab by mouth every six (6) hours as needed for Pain. amLODIPine (NORVASC) 10 mg tablet   Yes   Sig: Take  by mouth daily. atorvastatin (LIPITOR) 40 mg tablet   Yes   Sig: Take 40 mg by mouth daily. carvedilol (COREG) 12.5 mg tablet   Yes   Sig: Take 12.5 mg by mouth two (2) times daily (with meals). cyclobenzaprine (FLEXERIL) 10 mg tablet   No   Sig: Take 1 Tab by mouth three (3) times daily as needed for Muscle Spasm(s). gabapentin (NEURONTIN) 300 mg capsule   No   Sig: Take 300 mg by mouth daily. labetalol (NORMODYNE) 200 mg tablet   No   Sig: Take 200 mg by mouth two (2) times a day.    lisinopril (Amalia Fake) 10 mg tablet   No   Sig: Take 30 mg by mouth daily. metFORMIN (GLUCOPHAGE) 500 mg tablet   No   Sig: Take 500 mg by mouth two (2) times daily (with meals). Facility-Administered Medications: None       Please contact the main inpatient pharmacy with any questions or concerns at [phone] and we will direct you to the clinical pharmacist covering this patient's care while in-house.    Allan Krabbe, [facility]

## 2020-05-25 NOTE — ROUTINE PROCESS
Primary Nurse Tee Holden RN and Juan José Oconnell RN performed a dual skin assessment on this patient No impairment noted Darwin score is 21

## 2020-05-25 NOTE — H&P
[address]  HISTORY AND PHYSICAL    Name:  Jane Alonzo  MR#:  [id number]  :  1950  ACCOUNT #:  [de-identified]  ADMIT DATE:  2020      PRIMARY CARE PROVIDER:  Jose Boudreaux MD    SOURCE OF INFORMATION:  Patient. CHIEF COMPLAINT:  Right foot pain and swelling, 3-4 days' duration. HISTORY OF PRESENTING ILLNESS:  This is a 24-[facility]-old [facility] American male with past medical history significant for type 2 diabetes mellitus, hypertension, peripheral neuropathy, AFib, and cardiomyopathy, who presented to [address] [place] [address] Emergency Department with right foot pain and swelling, 3-4 days' duration. He states that he stepped on a nail that went through the bottom of his shoe and he did not notice it after several hours because he said he has neuropathy. [place] [facility] [place]. Despite that, his swelling and pain were progressively worse and decided to come to [address] [place] [address] Emergency Department. He denied any fevers, chills, sweating, nausea, vomiting, cough, abdominal pain, urinary complaint, or abnormal bowel movement. Denied any exposure to COVID-19 infected people. In the ER, his vital signs, blood pressure was 184/95, pulse 91, temperature 97.7, saturation of oxygen 97% on room air. X-ray of the foot was done, no acute abnormalities. Wound culture was taken, received IV vancomycin and Zosyn and referred to hospitalist service for further evaluation and admission. REVIEW OF SYSTEMS:  Pertinent positive findings mentioned in the HPI. All systems reviewed, no any other positive finding. PAST MEDICAL HISTORY:  1. Type 2 diabetes mellitus. 2.  Hypertension. 3.  Cardiomyopathy. 4.  AFib. MEDICATIONS:  Prior to admission medications include:  1. Cyclobenzaprine 10 mg 3 times daily as needed. 2.  Labetalol 200 mg p.o. b.i.d.  3.  Coreg 12.5 mg p.o. b.i.d.  4.  Norvasc 10 mg p.o. daily. 5.  Gabapentin 300 mg p.o. daily.  6.  Lisinopril 10 mg p.o. daily. 7.  Lipitor 40 mg p.o. daily. 8.  Metformin 500 mg p.o. b.i.d.  9.  Tylenol 500 mg every 6 hours as needed. 10.  Metoprolol dose not mentioned. ALLERGIES:  CODEINE. SOCIAL HISTORY:  Lives with his family. No tobacco or alcohol abuse. Ambulates with a cane. Code status, full code. FAMILY HISTORY:  No family history of diabetes mellitus, hypertension, or chronic kidney disease. PHYSICAL EXAMINATION:  VITAL SIGNS:  Blood pressure 129/68, pulse 91, temperature 97.7, saturation of oxygen 96% on room air. GENERAL APPEARANCE:  The patient is alert, cooperative, not in distress. He appears his stated age. HEENT:  Pink conjunctivae. Anicteric sclerae. Moist tongue and buccal mucosa. LUNGS:  Decreased bronchial breath sounds to auscultation bilaterally. CHEST WALL:  No tenderness or deformity. CARDIOVASCULAR:  Regular rate and rhythm. S1 and S2 normal.  No murmur or gallop. ABDOMEN:  Soft, nontender. Bowel sounds normal.  No mass or organomegaly. EXTREMITIES:  Right foot pedal edema. There is an open wound on the plantar side 0.5 x 0.5 cm, tender to touch, warmth to touch. SKIN:  No rash or lesion. CENTRAL NERVOUS SYSTEM:  Conscious, well oriented to time, place, and person. Motor 5/5. Sensation intact. Cranial nerves II through XII grossly intact. LABORATORY DATA:  White blood cell count 12.0, hemoglobin 12.9, hematocrit 39.4, MCV 87.0, MCH 28.5, platelet count 039. Chemistry, sodium 136, potassium 3.4, chloride 103, CO2 29, anion gap 4, glucose 235, BUN 14, creatinine 0.93, BUN/creatinine ratio 15, calcium 10.1. Bilirubin 0.6, total protein 87, albumin 3.8, globulin 4.9, ALT 25, AST 14, alkaline phosphatase 120. Lactic acid 0.9. X-ray of right foot, no acute abnormalities. ASSESSMENT:  1. Right foot puncture wound with cellulitis. 2.  Type 2 diabetes mellitus. 3.  Hypertension. 4.  Peripheral neuropathy.   5.  History of atrial fibrillation. 6.  Hyperlipidemia. 7.  History of cardiomyopathy. PLAN:  1. Right foot puncture wound with cellulitis. Admit the patient to medical floor. Start IV vancomycin and Zosyn. Advised the patient to elevate the leg. Follow up wound culture, blood culture. X-ray did not show any acute finding. No fluctuating mass, consult Wound Care. 2.  Type 2 diabetes mellitus with hyperglycemia. Hold home metformin. will give Lantus 25 units at bedtime, sliding scale and monitor fingerstick glucose. Check hemoglobin A1c.  3.  Hypertension. Resume home Norvasc, Coreg, lisinopril and monitor blood pressure and p.r.n. IV hydralazine. The patient on labetalol and Coreg, and I held labetalol. 4.  Peripheral neuropathy, stable. Continue home medication gabapentin. 5.  History of hyperlipidemia. Continue Lipitor. 6.  History of AFib, rate controlled. will continue Coreg and Xarelto  check EKG. 7.  History of cardiomyopathy, compensated. Continue home medications, Coreg, lisinopril. Monitor intake and output. 8.  DVT prophylaxis, on xarelto    FUNCTIONAL STATUS PRIOR TO ADMISSION:  The patient ambulates with a cane.         MD ALEX Horan/[email or web address]  D:  05/25/2020 11:56  T:  05/25/2020 13:43  JOB #:  [id number]

## 2020-05-25 NOTE — ED NOTES
TRANSFER - OUT REPORT:    Verbal report given to Avelino Quintero RN (name) on Brad Nagel  being transferred to [facility](unit) for routine progression of care       Report consisted of patients Situation, Background, Assessment and   Recommendations(SBAR). Information from the following report(s) SBAR, Kardex, ED Summary, Intake/Output, MAR and Recent Results was reviewed with the receiving nurse. Lines:   Peripheral IV 05/25/20 Left Antecubital (Active)   Site Assessment Clean, dry, & intact 5/25/2020  9:46 AM   Phlebitis Assessment 0 5/25/2020  9:46 AM   Infiltration Assessment 0 5/25/2020  9:46 AM   Dressing Status Clean, dry, & intact 5/25/2020  9:46 AM   Dressing Type Transparent 5/25/2020  9:46 AM   Hub Color/Line Status Pink 5/25/2020  9:46 AM   Action Taken Blood drawn 5/25/2020  9:46 AM        Opportunity for questions and clarification was provided.

## 2020-05-25 NOTE — ROUTINE PROCESS
Bedside and Verbal shift change report given to Edgar Abbasi (oncoming nurse) by Ela Starr (offgoing nurse). Report included the following information SBAR, Kardex, Intake/Output, MAR and Recent Results.

## 2020-05-25 NOTE — PROGRESS NOTES
Pharmacy Note - Re: Renal Dose Adjustment (Piperacillin-Tazobactam)    Day #1 of Piperacillin-Tazobactam  Indication: right foot puncture wound with cellulitis  Current regimen: 2.25g IV q8hr  Abx regimen: pip-tazo, vanc  Recent Labs     20  0941   WBC 12.0*   CREA 0.93   BUN 14     Est CrCl: 90.1 ml/min; UO: not yet documented (new admission)  Temp (24hrs), Av.2 °F (36.8 °C), Min:97.7 °F (36.5 °C), Max:98.8 °F (37.1 °C)    Cultures:    paired blood cx: pending   wound cx (foot): no organisms on Gram stain, cx pending    Plan: Change to 3.375g IV q8hr for CrCl >20 ml/min per P&T/MEC policy    Sherryle Prophet, KoreyD

## 2020-05-25 NOTE — PROGRESS NOTES
Pharmacist Note - Vancomycin Dosing    Consult provided for this 71 y.o. male for indication of Skin and Soft Tissue Infection- (wound infection)  Antibiotic regimen(s):zosyn+Vancomycin  Patient on vancomycin PTA? NO     Recent Labs     20  0941   WBC 12.0*   CREA 0.93   BUN 14     Frequency of BMP: daily x3  Height: 182.9 cm  Weight: 96.2 kg  Est CrCl: ~ 90 ml/min; UO: ~ ml/kg/hr  Temp (24hrs), Av.7 °F (36.5 °C), Min:97.7 °F (36.5 °C), Max:97.7 °F (36.5 °C)    Cultures:   wound  in process    Goal trough = 10 - 15 mcg/mL    Therapy will be initiated with a loading dose of 2000 mg IV x 1 to be followed by a maintenance dose of 1500 mg IV every 16 hours. Pharmacy to follow patient daily and order levels / make dose adjustments as appropriate.

## 2020-05-25 NOTE — ED PROVIDER NOTES
54-year-old male history of diabetes, hypertension presenting to the ER for right foot pain. Patient reports that about 1 week ago he stepped on a nail that went through the bottom of his shoe and implanted in the bottom of his foot, notes that he did not notice it for several hours as he has neuropathy. Patient went to [facility] that day, prescription bottles note that his visit was on May 18, was written for Augmentin and Cipro. Patient reports that he took antibiotics as directed and finish them, but notes that he has had continued worsening pain, swelling of the foot. Denies fever, chills or other systemic symptoms. No other concerns. All symptoms mild to moderate without modifying factors noted. Past medical history: As above  Social history: Non-smoker    The history is provided by the patient. Foot Pain           Past Medical History:   Diagnosis Date    Diabetes ([facility] [address]     Hypertension        Past Surgical History:   Procedure Laterality Date    HX OTHER SURGICAL      cyst removal to back         History reviewed. No pertinent family history.     Social History     Socioeconomic History    Marital status: SINGLE     Spouse name: Not on file    Number of children: Not on file    Years of education: Not on file    Highest education level: Not on file   Occupational History    Not on file   Social Needs    Financial resource strain: Not on file    Food insecurity     Worry: Not on file     Inability: Not on file    Transportation needs     Medical: Not on file     Non-medical: Not on file   Tobacco Use    Smoking status: Never Smoker    Smokeless tobacco: Never Used   Substance and Sexual Activity    Alcohol use: No    Drug use: No    Sexual activity: Not on file   Lifestyle    Physical activity     Days per week: Not on file     Minutes per session: Not on file    Stress: Not on file   Relationships    Social connections     Talks on phone: Not on file     Gets together: Not on file Attends Sikh service: Not on file     Active member of club or organization: Not on file     Attends meetings of clubs or organizations: Not on file     Relationship status: Not on file    Intimate partner violence     Fear of current or ex partner: Not on file     Emotionally abused: Not on file     Physically abused: Not on file     Forced sexual activity: Not on file   Other Topics Concern    Not on file   Social History Narrative    Not on file         ALLERGIES: Codeine    Review of Systems   Constitutional: Negative for fever. HENT: Negative for facial swelling. Respiratory: Negative for shortness of breath. Cardiovascular: Negative for chest pain. Gastrointestinal: Negative for vomiting. Skin: Positive for color change and wound. Neurological: Negative for syncope. All other systems reviewed and are negative. Vitals:    05/25/20 1146 05/25/20 1200 05/25/20 1225 05/25/20 1608   BP: 132/73 123/76 121/76 129/84   Pulse:  69 70 75   Resp:  16 16 16   Temp:  98 °F (36.7 °C) 98.8 °F (37.1 °C) 98.7 °F (37.1 °C)   SpO2: 99% 99% 99% 98%   Weight:       Height:                Physical Exam  Vitals signs and nursing note reviewed. Constitutional:       Appearance: He is well-developed. Comments: Pleasant, well-appearing elderly -American male   HENT:      Head: Normocephalic. Eyes:      Conjunctiva/sclera: Conjunctivae normal.   Neck:      Musculoskeletal: Neck supple. Cardiovascular:      Rate and Rhythm: Normal rate. Pulmonary:      Effort: Pulmonary effort is normal. No respiratory distress. Musculoskeletal: Normal range of motion. Comments: Right foot: Swelling noted to the entire foot when compared to the left. Pulses intact.   On the plantar aspect of the foot, patient with a 1 cm in diameter wound noted just superior to the heel, mild erythema and warmth, when pressure is applied a small amount of purulent drainage able to be expressed   Skin:     General: Skin is warm and dry. Neurological:      Mental Status: He is alert and oriented to person, place, and time. MDM  Number of Diagnoses or Management Options  Puncture wound of right foot, subsequent encounter:   Diagnosis management comments: 71year old male presenting for worsening foot swelling, pain at the site of prior puncture wound. Already finished course of augmentin/Cipro. Will check labs, anticpate admission given failure to improve on appropriate abx. Amount and/or Complexity of Data Reviewed  Clinical lab tests: ordered and reviewed  Tests in the radiology section of CPT®: ordered and reviewed  Discuss the patient with other providers: yes (Dr. Bernardino Bonilla ED attending)           Procedures               Hospitalist Jonathan Serve for Admission  10:50 AM    ED Room Number: 507/02  Patient Name and age:  Loki Read 71 y.o.  male  Working Diagnosis:   1. Puncture wound of right foot, subsequent encounter        COVID-19 Suspicion:  no    Code Status:  Full Code  Readmission: no  Isolation Requirements:  no  Recommended Level of Care:  med/surg  Department:[facility] Adult ED - 21   Other: 77-year-old diabetic sustained a puncture wound to his foot on May 18, had a nail go through his tennis shoe, was there for several hours before he noticed it due to neuropathy. [place] [facility] [place] C[place] and Augmentin, finished his antibiotics, came in today for pain, swelling. Puncture wound to the plantar foot with some drainage noted, white count of 12, modest elevation to inflammatory markers. Given that he has failed outpatient therapy, wound culture sent, will start on Vanco and Zosyn.

## 2020-05-26 ENCOUNTER — APPOINTMENT (OUTPATIENT)
Dept: MRI IMAGING | Age: 70
DRG: 571 | End: 2020-05-26
Attending: PHYSICIAN ASSISTANT
Payer: MEDICARE

## 2020-05-26 LAB
ALBUMIN SERPL-MCNC: 3.2 G/DL (ref 3.5–5)
ALBUMIN/GLOB SERPL: 0.8 {RATIO} (ref 1.1–2.2)
ALP SERPL-CCNC: 87 U/L (ref 45–117)
ALT SERPL-CCNC: 21 U/L (ref 12–78)
ANION GAP SERPL CALC-SCNC: 6 MMOL/L (ref 5–15)
AST SERPL-CCNC: 16 U/L (ref 15–37)
BILIRUB SERPL-MCNC: 0.6 MG/DL (ref 0.2–1)
BUN SERPL-MCNC: 12 MG/DL (ref 6–20)
BUN/CREAT SERPL: 14 (ref 12–20)
CALCIUM SERPL-MCNC: 9.2 MG/DL (ref 8.5–10.1)
CHLORIDE SERPL-SCNC: 104 MMOL/L (ref 97–108)
CO2 SERPL-SCNC: 26 MMOL/L (ref 21–32)
CREAT SERPL-MCNC: 0.86 MG/DL (ref 0.7–1.3)
ERYTHROCYTE [DISTWIDTH] IN BLOOD BY AUTOMATED COUNT: 11.9 % (ref 11.5–14.5)
GLOBULIN SER CALC-MCNC: 4.1 G/DL (ref 2–4)
GLUCOSE BLD STRIP.AUTO-MCNC: 122 MG/DL (ref 65–100)
GLUCOSE BLD STRIP.AUTO-MCNC: 134 MG/DL (ref 65–100)
GLUCOSE BLD STRIP.AUTO-MCNC: 134 MG/DL (ref 65–100)
GLUCOSE BLD STRIP.AUTO-MCNC: 142 MG/DL (ref 65–100)
GLUCOSE SERPL-MCNC: 120 MG/DL (ref 65–100)
HCT VFR BLD AUTO: 34 % (ref 36.6–50.3)
HGB BLD-MCNC: 11.1 G/DL (ref 12.1–17)
MCH RBC QN AUTO: 28.2 PG (ref 26–34)
MCHC RBC AUTO-ENTMCNC: 32.6 G/DL (ref 30–36.5)
MCV RBC AUTO: 86.5 FL (ref 80–99)
NRBC # BLD: 0 K/UL (ref 0–0.01)
NRBC BLD-RTO: 0 PER 100 WBC
PLATELET # BLD AUTO: 387 K/UL (ref 150–400)
PMV BLD AUTO: 9.3 FL (ref 8.9–12.9)
POTASSIUM SERPL-SCNC: 3.3 MMOL/L (ref 3.5–5.1)
PROT SERPL-MCNC: 7.3 G/DL (ref 6.4–8.2)
RBC # BLD AUTO: 3.93 M/UL (ref 4.1–5.7)
SERVICE CMNT-IMP: ABNORMAL
SODIUM SERPL-SCNC: 136 MMOL/L (ref 136–145)
WBC # BLD AUTO: 10 K/UL (ref 4.1–11.1)

## 2020-05-26 PROCEDURE — 74011636637 HC RX REV CODE- 636/637: Performed by: HOSPITALIST

## 2020-05-26 PROCEDURE — 74011250636 HC RX REV CODE- 250/636: Performed by: HOSPITALIST

## 2020-05-26 PROCEDURE — 36415 COLL VENOUS BLD VENIPUNCTURE: CPT

## 2020-05-26 PROCEDURE — 74011000258 HC RX REV CODE- 258: Performed by: HOSPITALIST

## 2020-05-26 PROCEDURE — 74011250637 HC RX REV CODE- 250/637: Performed by: HOSPITALIST

## 2020-05-26 PROCEDURE — 82962 GLUCOSE BLOOD TEST: CPT

## 2020-05-26 PROCEDURE — 85027 COMPLETE CBC AUTOMATED: CPT

## 2020-05-26 PROCEDURE — 65270000032 HC RM SEMIPRIVATE

## 2020-05-26 PROCEDURE — 80053 COMPREHEN METABOLIC PANEL: CPT

## 2020-05-26 RX ORDER — GABAPENTIN 300 MG/1
300 CAPSULE ORAL DAILY
Status: DISCONTINUED | OUTPATIENT
Start: 2020-05-26 | End: 2020-05-30 | Stop reason: HOSPADM

## 2020-05-26 RX ORDER — POTASSIUM CHLORIDE 750 MG/1
40 TABLET, FILM COATED, EXTENDED RELEASE ORAL EVERY 4 HOURS
Status: COMPLETED | OUTPATIENT
Start: 2020-05-26 | End: 2020-05-26

## 2020-05-26 RX ADMIN — INSULIN GLARGINE 25 UNITS: 100 INJECTION, SOLUTION SUBCUTANEOUS at 21:26

## 2020-05-26 RX ADMIN — POTASSIUM CHLORIDE 40 MEQ: 750 TABLET, FILM COATED, EXTENDED RELEASE ORAL at 15:03

## 2020-05-26 RX ADMIN — INSULIN LISPRO 2 UNITS: 100 INJECTION, SOLUTION INTRAVENOUS; SUBCUTANEOUS at 17:13

## 2020-05-26 RX ADMIN — LISINOPRIL 5 MG: 5 TABLET ORAL at 09:42

## 2020-05-26 RX ADMIN — POTASSIUM CHLORIDE 40 MEQ: 750 TABLET, FILM COATED, EXTENDED RELEASE ORAL at 12:05

## 2020-05-26 RX ADMIN — OXYCODONE HYDROCHLORIDE AND ACETAMINOPHEN 1 TABLET: 5; 325 TABLET ORAL at 19:21

## 2020-05-26 RX ADMIN — CARVEDILOL 12.5 MG: 12.5 TABLET, FILM COATED ORAL at 07:01

## 2020-05-26 RX ADMIN — RIVAROXABAN 20 MG: 20 TABLET, FILM COATED ORAL at 09:54

## 2020-05-26 RX ADMIN — VANCOMYCIN HYDROCHLORIDE 1500 MG: 10 INJECTION, POWDER, LYOPHILIZED, FOR SOLUTION INTRAVENOUS at 03:44

## 2020-05-26 RX ADMIN — AMLODIPINE BESYLATE 10 MG: 5 TABLET ORAL at 09:42

## 2020-05-26 RX ADMIN — VANCOMYCIN HYDROCHLORIDE 1500 MG: 10 INJECTION, POWDER, LYOPHILIZED, FOR SOLUTION INTRAVENOUS at 18:41

## 2020-05-26 RX ADMIN — Medication 10 ML: at 21:26

## 2020-05-26 RX ADMIN — GABAPENTIN 300 MG: 300 CAPSULE ORAL at 12:05

## 2020-05-26 RX ADMIN — POTASSIUM CHLORIDE 40 MEQ: 750 TABLET, FILM COATED, EXTENDED RELEASE ORAL at 08:22

## 2020-05-26 RX ADMIN — POTASSIUM CHLORIDE 40 MEQ: 750 TABLET, FILM COATED, EXTENDED RELEASE ORAL at 19:18

## 2020-05-26 RX ADMIN — ATORVASTATIN CALCIUM 40 MG: 40 TABLET, FILM COATED ORAL at 09:42

## 2020-05-26 RX ADMIN — CARVEDILOL 12.5 MG: 12.5 TABLET, FILM COATED ORAL at 17:13

## 2020-05-26 RX ADMIN — PIPERACILLIN AND TAZOBACTAM 3.38 G: 3; .375 INJECTION, POWDER, FOR SOLUTION INTRAVENOUS at 10:02

## 2020-05-26 RX ADMIN — PIPERACILLIN AND TAZOBACTAM 3.38 G: 3; .375 INJECTION, POWDER, FOR SOLUTION INTRAVENOUS at 18:44

## 2020-05-26 RX ADMIN — PIPERACILLIN AND TAZOBACTAM 3.38 G: 3; .375 INJECTION, POWDER, FOR SOLUTION INTRAVENOUS at 03:20

## 2020-05-26 RX ADMIN — ACETAMINOPHEN 650 MG: 325 TABLET, FILM COATED ORAL at 09:54

## 2020-05-26 RX ADMIN — Medication 10 ML: at 14:48

## 2020-05-26 NOTE — CDMP QUERY
Pt admitted with Cellulitis. Pt noted to have DM 2 If possible, please document in progress notes and discharge summary the relationship, if any, between Cellulitis and Diabetes  Cellulitis due to DM  Cellulitis unrelated to DM  Other, please specify  Clinically unable to determine The medical record reflects the following: 
  Risk Factors:68 y/o male admitted with Cellulitis R foot following puncture injury. Hx of DM @, Peripheral Neuropathy, HTN, Clinical Indicators: Edema and drainage to R foot,  C-Reactive protein 6.38, DM with Hyperglycemia Treatment: IV Abx Zosyn and Vanco, Insulin, Podiatry consult, Wound Care consult Thank you, Chana Montano, RN [address] [place] [address] [phone]

## 2020-05-26 NOTE — PROGRESS NOTES
Bedside shift change report given to Opal Poon (oncoming nurse) by Iliana Mendoza (offgoing nurse). Report included the following information SBAR.

## 2020-05-26 NOTE — CONSULTS
Foot and Ankle Surgery Consult    Subjective:      Daja Davalos is a 71 y.o. male who presents for evaluation of his right foot   He stepped on a nail and has pain along the lateral aspect of the right foot     Past Medical History:   Diagnosis Date    Diabetes ([address]     Hypertension      Past Surgical History:   Procedure Laterality Date    HX OTHER SURGICAL      cyst removal to back      History reviewed. No pertinent family history.   Social History     Socioeconomic History    Marital status: SINGLE     Spouse name: Not on file    Number of children: Not on file    Years of education: Not on file    Highest education level: Not on file   Tobacco Use    Smoking status: Never Smoker    Smokeless tobacco: Never Used   Substance and Sexual Activity    Alcohol use: No    Drug use: No      Current Facility-Administered Medications   Medication Dose Route Frequency Provider Last Rate Last Dose    potassium chloride SR (KLOR-CON 10) tablet 40 mEq  40 mEq Oral Q4H Manju Cadena MD   40 mEq at 05/26/20 1503    gabapentin (NEURONTIN) capsule 300 mg  300 mg Oral DAILY Manju Cadena MD   300 mg at 05/26/20 1205    amLODIPine (NORVASC) tablet 10 mg  10 mg Oral DAILY Manju Cadena MD   10 mg at 05/26/20 0942    carvediloL (COREG) tablet 12.5 mg  12.5 mg Oral BID WITH MEALS Manju Cadena MD   12.5 mg at 05/26/20 0701    lisinopriL (PRINIVIL, ZESTRIL) tablet 5 mg  5 mg Oral DAILY Manju Cadena MD   5 mg at 05/26/20 0942    sodium chloride (NS) flush 5-40 mL  5-40 mL IntraVENous Q8H Manju Cadena MD   10 mL at 05/26/20 1448    sodium chloride (NS) flush 5-40 mL  5-40 mL IntraVENous PRN Manju Cadena MD        acetaminophen (TYLENOL) tablet 650 mg  650 mg Oral Q4H PRN Manju Cadena MD   650 mg at 05/26/20 0954    insulin glargine (LANTUS) injection 25 Units  25 Units SubCUTAneous QHS Manju Cadena MD   25 Units at 05/25/20 2200    insulin lispro (HUMALOG) injection   SubCUTAneous AC&HS Jesse Day MD   Stopped at 20 1630    glucose chewable tablet 16 g  4 Tab Oral PRN Jesse Day MD        glucagon (GLUCAGEN) injection 1 mg  1 mg IntraMUSCular PRN Jesse Day MD        Vancomycin Pharmacy Dosing   Other PRN Jesse Day MD        vancomycin (VANCOCIN) 1500 mg in  ml infusion  1,500 mg IntraVENous Q16H Jesse Day  mL/hr at 20 0344 1,500 mg at 20 0344    rivaroxaban (XARELTO) tablet 20 mg  20 mg Oral DAILY Jesse Day MD   20 mg at 20 0954    dextrose 10 % infusion 125-250 mL  125-250 mL IntraVENous PRN Jesse Day MD        piperacillin-tazobactam (ZOSYN) 3.375 g in 0.9% sodium chloride (MBP/ADV) 100 mL  3.375 g IntraVENous Q8H Jesse Day MD 25 mL/hr at 20 1002 3.375 g at 20 1002    oxyCODONE-acetaminophen (PERCOCET) 5-325 mg per tablet 1 Tab  1 Tab Oral Q6H PRN Jesse Day MD   1 Tab at 20 2100    atorvastatin (LIPITOR) tablet 40 mg  40 mg Oral DAILY Jesse Day MD   40 mg at 20 3077        Allergies   Allergen Reactions    Codeine Other (comments)     \"cause confusion and seeing double\"    OK to try percocet (per dr Keagan Cronin)       Review of Systems:  A comprehensive review of systems was negative except for that written in the History of Present Illness.     Objective:        Patient Vitals for the past 8 hrs:   BP Temp Pulse Resp SpO2   20 1512 119/67 98.3 °F (36.8 °C) 68 16 94 %   20 0757 128/83 98.1 °F (36.7 °C) 78 16 98 %       Temp (24hrs), Av.5 °F (36.9 °C), Min:98.1 °F (36.7 °C), Max:98.7 °F (37.1 °C)      Physical Exam:  There is a puncture wound plantar lateral aspect of the right foot   Depth is SQ (18mm)  The diameter is 4mm    No erythema  No draionage  No odor minimal edema  He has pain with palpation to the peroneal tendons and has decreased active ROM in plantarflexion of the 1st ray      Assessment:     Hospital Problems  Date Reviewed: 5/26/2020          Codes Class Noted POA    Open wound of right foot ICD-10-CM: G15.548K  ICD-9-CM: 892.0  5/25/2020 Unknown              Plan:     Probed the open wound >> applied beatdine wet to dry dressing  Will get an MRI to r/o peroneous Longus tear    Signed By: Aris Nissen, DPM     May 26, 2020

## 2020-05-26 NOTE — PROGRESS NOTES
ALEC:    RUR: 10%    Patient lives alone with friends nearby. Patient will drive himself home at discharge. Care Management Interventions  PCP Verified by CM: Yes  Mode of Transport at Discharge: Other (see comment)(Patient will drive himself home.)  Transition of Care Consult (CM Consult): Discharge Planning  MyChart Signup: No  Discharge Durable Medical Equipment: No  Physical Therapy Consult: No  Occupational Therapy Consult: No  Speech Therapy Consult: No  Current Support Network: Lives Alone  Confirm Follow Up Transport: Self  Discharge Location  Discharge Placement: Home    Reason for Admission:   Right foot wound                   RUR Score:       10%              Plan for utilizing home health:   No orders       PCP: First and Last name:  Delvin Bell MD   Name of Practice:    Are you a current patient: Yes/No: Yes   Approximate date of last visit: 1/5/2020                    Current Advanced Directive/Advance Care Plan:                        Not on file. Transition of Care Plan:      CM met with patient to introduce CM role, verify demographics and begin discharge planning. Patient lives in a house alone, but has friends nearby who check on him. [place] [facility] and [place] at [address] [place] [address].    Patient does not own any DME. Patient will drive himself home at discharge.     Linda Grimm, CARLIN/CRM

## 2020-05-26 NOTE — PROGRESS NOTES
Bedside shift change report given to Stas Sandy RN (oncoming nurse) by Brii Aguilar RN (offgoing nurse). Report included the following information SBAR, Kardex, Intake/Output, MAR and Recent Results.

## 2020-05-26 NOTE — WOUND CARE
WOCN Note:  
 
New consult for right foot wound. Chart shows: 
Admitted for right foot wound post nail puncture. History of DM & neuropathy WBC = 10 and he is on Zosyn & vancomycin Admitted from home. Podiatry has been consulted. Assessment:  
A&O x 4 and reports no pain - tolerates wound cleaning with no discomfort. Mobile and continent. Strong aplpable DP pulse in right foot with slight edema, no calor, or redness. 1. POA, right plantar foot puncture wound = 1 x 1 x 0.5 cm  100% devitalized fibrinous tissue with scant amount of purulent exudate. NO odor. Cleaned will with peroxide and area deroofed itself of thick skin to reveal the above described base. Dry gauze applied until he is seen by podiatry. Recommendations:   
Right foot: Santyl daily with cover dressing. Keep covered at all times. Daily foot inspections using a mirror. Tight blood sugar control. Transition of Care: Plan to follow as needed while admitted to hospital. Available as needed by podiatry and will wait for their consult before entering order for Santyl . RAHUL Urbano, RN, [employer] Certified Wound, Ostomy, Continence Nurse 
office [phone] 
pager [phone] or call  to page [phone]: see note that Dr. Richard Murphy, podiatry, is now managing foot wound. Wound Care Dept is available as needed - please reconsult.  AI Urbano

## 2020-05-26 NOTE — PROGRESS NOTES
Bedside shift change report given to Tamera Riley RN (oncoming nurse) by [address], RN (offgoing nurse). Report included the following information SBAR and Kardex.

## 2020-05-26 NOTE — PROGRESS NOTES
[facility] [facility] Adult  Hospitalist Group                                                                                          Hospitalist Progress Note  Nenita Jean MD  Answering service: [phone] from in house phone        Date of Service:  2020  NAME:  Luciano Ricketts  :  6353  MRN:  [id number]      Admission Summary: This is a 55[facility] with past medical history significant for type 2 diabetes mellitus, hypertension, peripheral neuropathy, AFib, and cardiomyopathy, who presented to [address] [place] [address] Emergency Department with right foot pain and swelling, 3-4 days' duration.     Interval history / Subjective:     He said he feels better, pain and swelling of right foot improving     Assessment & Plan:     Right foot puncture wound and cellulitis with possible abscess.  -failed outpatient abx treatment   -continue iv zosyn and vancomycin  -x ray of foot no acute abnormality   -podiatrist is consulted but they are not accepting his inurance  -will check with orthopedic service  -follow up wound culture    Type 2DM with hyperglycemia  -A1c 7.1  -on lantus, sliding scale, monitor finger stick glucose    HTN  -BP normal, continue norvasc, coreg and lisinopril, monitor BP    Peripheral neuropathy   -stable  -continue home gabapentin     Hypokalemia  -replace with kcl, repeat k level in am    Hx of Paroxysmal A Fib, rate controlled  -continue coreg and xarelto    Hyperlipidemia.  -continue lipitor    Hx of cardiomyopathy.  -compensated, continue lisinopril and coreg  -monitor I/O and daily weight        Code status: Full Code   DVT prophylaxis: [address] B [facility] discussed with: Patient/Family and Nurse  Anticipated Disposition: Home w/Family  Anticipated Discharge: 24 hours to 48 hours     Hospital Problems  Never Reviewed          Codes Class Noted POA    Open wound of right foot ICD-10-CM: S91.301A  ICD-9-CM: 892.0  2020 Unknown Vital Signs:    Last 24hrs VS reviewed since prior progress note. Most recent are:  Visit Vitals  /73   Pulse 68   Temp 98.7 °F (37.1 °C)   Resp 16   Ht 6' (1.829 m)   Wt 96.2 kg (212 lb)   SpO2 96%   BMI 28.75 kg/m²         Intake/Output Summary (Last 24 hours) at 5/26/2020 0744  Last data filed at 5/25/2020 2322  Gross per 24 hour   Intake 100 ml   Output 1850 ml   Net -1750 ml        Physical Examination:             Constitutional:  No acute distress, cooperative, pleasant    ENT:  Oral mucosa moist, oropharynx benign. Resp:  CTA bilaterally. No wheezing/rhonchi/rales. No accessory muscle use   CV:  Regular rhythm, normal rate, no murmurs, gallops, rubs    GI:  Soft, non distended, non tender. normoactive bowel sounds, no hepatosplenomegaly     Musculoskeletal:  No edema, right foot swelling improving, right plantar puncture wound, oozing pus,     Neurologic:  Moves all extremities. AAOx3, CN II-XII reviewed     Skin:  no skin rash except right foot plantar wound       Data Review:    Review and/or order of clinical lab test  Review and/or order of tests in the radiology section of CPT  Review and/or order of tests in the medicine section of CPT      Labs:     Recent Labs     05/26/20  0345 05/25/20  0941   WBC 10.0 12.0*   HGB 11.1* 12.9   HCT 34.0* 39.4    425*     Recent Labs     05/26/20  0345 05/25/20  0941    136   K 3.3* 3.7    103   CO2 26 29   BUN 12 14   CREA 0.86 0.93   * 235*   CA 9.2 10.1     Recent Labs     05/26/20  0345 05/25/20  0941   SGOT 16 14*   ALT 21 25   AP 87 120*   TBILI 0.6 0.6   TP 7.3 8.7*   ALB 3.2* 3.8   GLOB 4.1* 4.9*     No results for input(s): INR, PTP, APTT, INREXT in the last 72 hours. No results for input(s): FE, TIBC, PSAT, FERR in the last 72 hours. No results found for: FOL, RBCF   No results for input(s): PH, PCO2, PO2 in the last 72 hours. No results for input(s): CPK, CKNDX, TROIQ in the last 72 hours.     No lab exists for component: CPKMB  No results found for: CHOL, CHOLX, CHLST, CHOLV, HDL, HDLP, LDL, LDLC, DLDLP, TGLX, TRIGL, TRIGP, CHHD, CHHDX  Lab Results   Component Value Date/Time    Glucose (POC) 122 (H) 05/26/2020 06:35 AM    Glucose (POC) 133 (H) 05/25/2020 09:26 PM    Glucose (POC) 133 (H) 05/25/2020 04:53 PM     No results found for: COLOR, APPRN, SPGRU, REFSG, PAM, PROTU, GLUCU, KETU, BILU, UROU, JANETH, LEUKU, GLUKE, EPSU, BACTU, WBCU, RBCU, CASTS, UCRY      Medications Reviewed:     Current Facility-Administered Medications   Medication Dose Route Frequency    potassium chloride SR (KLOR-CON 10) tablet 40 mEq  40 mEq Oral Q4H    amLODIPine (NORVASC) tablet 10 mg  10 mg Oral DAILY    carvediloL (COREG) tablet 12.5 mg  12.5 mg Oral BID WITH MEALS    lisinopriL (PRINIVIL, ZESTRIL) tablet 5 mg  5 mg Oral DAILY    sodium chloride (NS) flush 5-40 mL  5-40 mL IntraVENous Q8H    sodium chloride (NS) flush 5-40 mL  5-40 mL IntraVENous PRN    acetaminophen (TYLENOL) tablet 650 mg  650 mg Oral Q4H PRN    insulin glargine (LANTUS) injection 25 Units  25 Units SubCUTAneous QHS    insulin lispro (HUMALOG) injection   SubCUTAneous AC&HS    glucose chewable tablet 16 g  4 Tab Oral PRN    glucagon (GLUCAGEN) injection 1 mg  1 mg IntraMUSCular PRN    Vancomycin Pharmacy Dosing   Other PRN    vancomycin (VANCOCIN) 1500 mg in  ml infusion  1,500 mg IntraVENous Q16H    rivaroxaban (XARELTO) tablet 20 mg  20 mg Oral DAILY    dextrose 10 % infusion 125-250 mL  125-250 mL IntraVENous PRN    piperacillin-tazobactam (ZOSYN) 3.375 g in 0.9% sodium chloride (MBP/ADV) 100 mL  3.375 g IntraVENous Q8H    oxyCODONE-acetaminophen (PERCOCET) 5-325 mg per tablet 1 Tab  1 Tab Oral Q6H PRN    atorvastatin (LIPITOR) tablet 40 mg  40 mg Oral DAILY     ______________________________________________________________________  EXPECTED LENGTH OF STAY: - - -  ACTUAL LENGTH OF STAY:          1                 Endalkachew Janalyn Closs, MD

## 2020-05-27 ENCOUNTER — APPOINTMENT (OUTPATIENT)
Dept: MRI IMAGING | Age: 70
DRG: 571 | End: 2020-05-27
Attending: PHYSICIAN ASSISTANT
Payer: MEDICARE

## 2020-05-27 LAB
ANION GAP SERPL CALC-SCNC: 6 MMOL/L (ref 5–15)
BACTERIA SPEC CULT: NORMAL
BUN SERPL-MCNC: 11 MG/DL (ref 6–20)
BUN/CREAT SERPL: 13 (ref 12–20)
CALCIUM SERPL-MCNC: 9.1 MG/DL (ref 8.5–10.1)
CHLORIDE SERPL-SCNC: 107 MMOL/L (ref 97–108)
CO2 SERPL-SCNC: 25 MMOL/L (ref 21–32)
COMMENT, HOLDF: NORMAL
CREAT SERPL-MCNC: 0.82 MG/DL (ref 0.7–1.3)
GLUCOSE BLD STRIP.AUTO-MCNC: 106 MG/DL (ref 65–100)
GLUCOSE BLD STRIP.AUTO-MCNC: 109 MG/DL (ref 65–100)
GLUCOSE BLD STRIP.AUTO-MCNC: 117 MG/DL (ref 65–100)
GLUCOSE BLD STRIP.AUTO-MCNC: 169 MG/DL (ref 65–100)
GLUCOSE SERPL-MCNC: 185 MG/DL (ref 65–100)
GRAM STN SPEC: NORMAL
GRAM STN SPEC: NORMAL
POTASSIUM SERPL-SCNC: 3.9 MMOL/L (ref 3.5–5.1)
SAMPLES BEING HELD,HOLD: NORMAL
SERVICE CMNT-IMP: ABNORMAL
SERVICE CMNT-IMP: NORMAL
SODIUM SERPL-SCNC: 138 MMOL/L (ref 136–145)

## 2020-05-27 PROCEDURE — 74011250636 HC RX REV CODE- 250/636: Performed by: HOSPITALIST

## 2020-05-27 PROCEDURE — A9575 INJ GADOTERATE MEGLUMI 0.1ML: HCPCS | Performed by: HOSPITALIST

## 2020-05-27 PROCEDURE — 65270000032 HC RM SEMIPRIVATE

## 2020-05-27 PROCEDURE — 36415 COLL VENOUS BLD VENIPUNCTURE: CPT

## 2020-05-27 PROCEDURE — 82962 GLUCOSE BLOOD TEST: CPT

## 2020-05-27 PROCEDURE — 80048 BASIC METABOLIC PNL TOTAL CA: CPT

## 2020-05-27 PROCEDURE — 74011636637 HC RX REV CODE- 636/637: Performed by: HOSPITALIST

## 2020-05-27 PROCEDURE — 74011000258 HC RX REV CODE- 258: Performed by: HOSPITALIST

## 2020-05-27 PROCEDURE — 73720 MRI LWR EXTREMITY W/O&W/DYE: CPT

## 2020-05-27 PROCEDURE — 74011250637 HC RX REV CODE- 250/637: Performed by: HOSPITALIST

## 2020-05-27 RX ORDER — ACETAMINOPHEN 500 MG
1000 TABLET ORAL
Status: DISCONTINUED | OUTPATIENT
Start: 2020-05-27 | End: 2020-05-30 | Stop reason: HOSPADM

## 2020-05-27 RX ORDER — GADOTERATE MEGLUMINE 376.9 MG/ML
20 INJECTION INTRAVENOUS
Status: COMPLETED | OUTPATIENT
Start: 2020-05-27 | End: 2020-05-27

## 2020-05-27 RX ADMIN — PIPERACILLIN AND TAZOBACTAM 3.38 G: 3; .375 INJECTION, POWDER, FOR SOLUTION INTRAVENOUS at 19:46

## 2020-05-27 RX ADMIN — PIPERACILLIN AND TAZOBACTAM 3.38 G: 3; .375 INJECTION, POWDER, FOR SOLUTION INTRAVENOUS at 03:02

## 2020-05-27 RX ADMIN — INSULIN LISPRO 2 UNITS: 100 INJECTION, SOLUTION INTRAVENOUS; SUBCUTANEOUS at 11:32

## 2020-05-27 RX ADMIN — AMLODIPINE BESYLATE 10 MG: 5 TABLET ORAL at 09:02

## 2020-05-27 RX ADMIN — RIVAROXABAN 20 MG: 20 TABLET, FILM COATED ORAL at 09:02

## 2020-05-27 RX ADMIN — CARVEDILOL 12.5 MG: 12.5 TABLET, FILM COATED ORAL at 17:01

## 2020-05-27 RX ADMIN — Medication 10 ML: at 07:25

## 2020-05-27 RX ADMIN — GADOTERATE MEGLUMINE 20 ML: 376.9 INJECTION INTRAVENOUS at 14:27

## 2020-05-27 RX ADMIN — PIPERACILLIN AND TAZOBACTAM 3.38 G: 3; .375 INJECTION, POWDER, FOR SOLUTION INTRAVENOUS at 11:18

## 2020-05-27 RX ADMIN — ACETAMINOPHEN 1000 MG: 500 TABLET, FILM COATED ORAL at 11:32

## 2020-05-27 RX ADMIN — VANCOMYCIN HYDROCHLORIDE 1500 MG: 10 INJECTION, POWDER, LYOPHILIZED, FOR SOLUTION INTRAVENOUS at 11:17

## 2020-05-27 RX ADMIN — LISINOPRIL 5 MG: 5 TABLET ORAL at 09:02

## 2020-05-27 RX ADMIN — OXYCODONE HYDROCHLORIDE AND ACETAMINOPHEN 1 TABLET: 5; 325 TABLET ORAL at 03:04

## 2020-05-27 RX ADMIN — GABAPENTIN 300 MG: 300 CAPSULE ORAL at 09:02

## 2020-05-27 RX ADMIN — CARVEDILOL 12.5 MG: 12.5 TABLET, FILM COATED ORAL at 07:25

## 2020-05-27 RX ADMIN — ACETAMINOPHEN 1000 MG: 500 TABLET, FILM COATED ORAL at 19:46

## 2020-05-27 RX ADMIN — ATORVASTATIN CALCIUM 40 MG: 40 TABLET, FILM COATED ORAL at 09:02

## 2020-05-27 NOTE — PROGRESS NOTES
[facility] [facility] Adult  Hospitalist Group                                                                                          Hospitalist Progress Note  Kavitha Lu MD  Answering service: [phone] from in house phone        Date of Service:  2020  NAME:  Daja Davalos  :  1950  MRN:  [id number]      Admission Summary: This is a 70-year-old [facility] American male with past medical history significant for type 2 diabetes mellitus, hypertension, peripheral neuropathy, AFib, and cardiomyopathy, who presented to [facility] Emergency Department with right foot pain and swelling, 3-4 days' duration.     Interval history / Subjective:     He said he feels better, pain and swelling of right foot improving     Assessment & Plan:     Right foot puncture wound and cellulitis with possible abscess.  -failed outpatient abx treatment   -continue iv zosyn and vancomycin  -x ray of foot no acute abnormality   -wound cx no growth so far  -MRI  result pending  -wound care nurse on board  -podiatrist on board    Type 2DM with hyperglycemia  -A1c 7.1  -on lantus, sliding scale, monitor finger stick glucose    HTN  -BP normal, continue norvasc, coreg and lisinopril, monitor BP    Peripheral neuropathy   -stable  -continue home gabapentin     Hypokalemia  -replaced and resolved    Hx of Paroxysmal A Fib, rate controlled  -continue coreg and xarelto    Hyperlipidemia.  -continue lipitor    Hx of cardiomyopathy.  -compensated, continue lisinopril and coreg  -monitor I/O and daily weight        Code status: Full Code   DVT prophylaxis: 09[address] B [facility] discussed with: Patient/Family and Nurse  Anticipated Disposition: Home w/Family  Anticipated Discharge: 24 hours to 48 hours     Hospital Problems  Date Reviewed: 2020          Codes Class Noted POA    Open wound of right foot ICD-10-CM: S91.301A  ICD-9-CM: 892.0  2020 Unknown              Vital Signs:    Last 24hrs VS reviewed since prior progress note. Most recent are:  Visit Vitals  /76   Pulse 73   Temp 99.1 °F (37.3 °C)   Resp 18   Ht 6' (1.829 m)   Wt 96.2 kg (212 lb)   SpO2 99%   BMI 28.75 kg/m²         Intake/Output Summary (Last 24 hours) at 5/27/2020 1342  Last data filed at 5/27/2020 0050  Gross per 24 hour   Intake 240 ml   Output 1650 ml   Net -1410 ml        Physical Examination:             Constitutional:  No acute distress, cooperative, pleasant    ENT:  Oral mucosa moist, oropharynx benign. Resp:  CTA bilaterally. No wheezing/rhonchi/rales. No accessory muscle use   CV:  Regular rhythm, normal rate, no murmurs, gallops, rubs    GI:  Soft, non distended, non tender. normoactive bowel sounds, no hepatosplenomegaly     Musculoskeletal:  No edema, right foot swelling improving, right plantar puncture wound, oozing pus,     Neurologic:  Moves all extremities. AAOx3, CN II-XII reviewed     Skin:  no skin rash except right foot plantar wound       Data Review:    Review and/or order of clinical lab test  Review and/or order of tests in the radiology section of CPT  Review and/or order of tests in the medicine section of CPT      Labs:     Recent Labs     05/26/20  0345 05/25/20  0941   WBC 10.0 12.0*   HGB 11.1* 12.9   HCT 34.0* 39.4    425*     Recent Labs     05/27/20  0301 05/26/20  0345 05/25/20  0941    136 136   K 3.9 3.3* 3.7    104 103   CO2 25 26 29   BUN 11 12 14   CREA 0.82 0.86 0.93   * 120* 235*   CA 9.1 9.2 10.1     Recent Labs     05/26/20  0345 05/25/20  0941   ALT 21 25   AP 87 120*   TBILI 0.6 0.6   TP 7.3 8.7*   ALB 3.2* 3.8   GLOB 4.1* 4.9*     No results for input(s): INR, PTP, APTT, INREXT, INREXT in the last 72 hours. No results for input(s): FE, TIBC, PSAT, FERR in the last 72 hours. No results found for: FOL, RBCF   No results for input(s): PH, PCO2, PO2 in the last 72 hours. No results for input(s): CPK, CKNDX, TROIQ in the last 72 hours.     No lab exists for component: CPKMB  No results found for: CHOL, CHOLX, CHLST, CHOLV, HDL, HDLP, LDL, LDLC, DLDLP, TGLX, TRIGL, TRIGP, CHHD, CHHDX  Lab Results   Component Value Date/Time    Glucose (POC) 169 (H) 05/27/2020 11:25 AM    Glucose (POC) 117 (H) 05/27/2020 06:36 AM    Glucose (POC) 134 (H) 05/26/2020 09:11 PM    Glucose (POC) 142 (H) 05/26/2020 04:10 PM    Glucose (POC) 134 (H) 05/26/2020 11:11 AM     No results found for: COLOR, APPRN, SPGRU, REFSG, PAM, PROTU, GLUCU, KETU, BILU, UROU, JANETH, LEUKU, GLUKE, EPSU, BACTU, WBCU, RBCU, CASTS, UCRY      Medications Reviewed:     Current Facility-Administered Medications   Medication Dose Route Frequency    [START ON 5/28/2020] Vancomycin, Trough - Please draw IMMEDIATELY PRIOR to starting 03:00 dose on Thursday, 5/28/2020. Thanks!    Other ONCE    acetaminophen (TYLENOL) tablet 1,000 mg  1,000 mg Oral Q6H PRN    gabapentin (NEURONTIN) capsule 300 mg  300 mg Oral DAILY    amLODIPine (NORVASC) tablet 10 mg  10 mg Oral DAILY    carvediloL (COREG) tablet 12.5 mg  12.5 mg Oral BID WITH MEALS    lisinopriL (PRINIVIL, ZESTRIL) tablet 5 mg  5 mg Oral DAILY    sodium chloride (NS) flush 5-40 mL  5-40 mL IntraVENous Q8H    sodium chloride (NS) flush 5-40 mL  5-40 mL IntraVENous PRN    insulin glargine (LANTUS) injection 25 Units  25 Units SubCUTAneous QHS    insulin lispro (HUMALOG) injection   SubCUTAneous AC&HS    glucose chewable tablet 16 g  4 Tab Oral PRN    glucagon (GLUCAGEN) injection 1 mg  1 mg IntraMUSCular PRN    Vancomycin Pharmacy Dosing   Other PRN    vancomycin (VANCOCIN) 1500 mg in  ml infusion  1,500 mg IntraVENous Q16H    rivaroxaban (XARELTO) tablet 20 mg  20 mg Oral DAILY    dextrose 10 % infusion 125-250 mL  125-250 mL IntraVENous PRN    piperacillin-tazobactam (ZOSYN) 3.375 g in 0.9% sodium chloride (MBP/ADV) 100 mL  3.375 g IntraVENous Q8H    atorvastatin (LIPITOR) tablet 40 mg  40 mg Oral DAILY ______________________________________________________________________  EXPECTED LENGTH OF STAY: 3d 4h  ACTUAL LENGTH OF STAY:          2                 Dabhargavi Metzger, MD

## 2020-05-27 NOTE — ADT AUTH CERT NOTES
PER MESSAGE FROM NURSE: 
 
Primary MD refused to downgrade pt to obs Patient Demographics Patient Name Azeb Dominguez 
[id number] Sex Male  
1950 Address [address] Phone [phone] (Home) [phone] (Mobile) CSN:  
[id number] Admit Date: Admit Time Room Bed May 25, 2020  9:21  [[id number]] 02 [[id number]] Attending Providers Provider Pager From To Claudene Lacrosse, MD  20 Theron Gottron, MD  20 Emergency Contact(s) Name Relation Home Work Mobile Rose Will [phone] Utilization Reviews  
 
   
Requested clinicals 20 by Mario Davenport RN  
 
   
Review Entered Review Status 2020 10:12 In Primary  
   
Criteria Review Admission Summary:  
  
This is a 79-year-old [facility] American male with past medical history significant for type 2 diabetes mellitus, hypertension, peripheral neuropathy, AFib, and cardiomyopathy, who presented to [facility] Emergency Department with right foot pain and swelling, 3-4 days' duration. 
  
Interval history / Subjective:  
  
He said he feels better, pain and swelling of right foot improving  
  
Assessment & Plan:  
  
Right foot puncture wound and cellulitis with possible abscess. 
-failed outpatient abx treatment  
-continue iv zosyn and vancomycin 
-x ray of foot no acute abnormality  
-podiatrist is consulted but they are not accepting his inurance 
-will check with orthopedic service 
-follow up wound culture 
  
Type 2DM with hyperglycemia 
-A1c 7.1 
-on lantus, sliding scale, monitor finger stick glucose 
  
HTN 
-BP normal, continue norvasc, coreg and lisinopril, monitor BP 
  
Peripheral neuropathy  
-stable 
-continue home gabapentin  
  
Hypokalemia 
-replace with kcl, repeat k level in am 
  
Hx of Paroxysmal A Fib, rate controlled 
-continue coreg and xarelto 
  
Hyperlipidemia. 
-continue lipitor 
  
Hx of cardiomyopathy. -compensated, continue lisinopril and coreg 
-monitor I/O and daily weight 
  
  
  
Code status: Full Code  
DVT prophylaxis: xarelto 
  
Care Plan discussed with: Patient/Family and Nurse Anticipated Disposition: Home w/Family Anticipated Discharge: 24 hours to 48 hours  
  
                
                     
Hospital Problems  Never Reviewed  
        Codes Class Noted POA  
  Open wound of right foot ICD-10-CM: Z79.189F ICD-9-CM: 892.0   5/25/2020 Unknown  
     
   
  
  
Vital Signs:  
 Last 24hrs VS reviewed since prior progress note. Most recent are: 
Visit Vitals /73 Pulse 68 Temp 98.7 °F (37.1 °C) Resp 16 Ht 6' (1.829 m) Wt 96.2 kg (212 lb) SpO2 96% BMI 28.75 kg/m²  
  
  
  
Intake/Output Summary (Last 24 hours) at 5/26/2020 1255 Last data filed at 5/25/2020 2322 
     
Gross per 24 hour Intake 100 ml Output 1850 ml Net -1750 ml  
  
  
Physical Examination:  
  
  
  
Constitutional:  No acute distress, cooperative, pleasant   
ENT:  Oral mucosa moist, oropharynx benign. Resp:  CTA bilaterally. No wheezing/rhonchi/rales. No accessory muscle use CV:  Regular rhythm, normal rate, no murmurs, gallops, rubs  
 GI:  Soft, non distended, non tender. normoactive bowel sounds, no hepatosplenomegaly   
 Musculoskeletal:  No edema, right foot swelling improving, right plantar puncture wound, oozing pus,   
 Neurologic:  Moves all extremities.  AAOx3, CN II-XII reviewed                       Skin:  no skin rash except right foot plantar wound 
   
  
Data Review:  
 Review and/or order of clinical lab test 
Review and/or order of tests in the radiology section of CPT Review and/or order of tests in the medicine section of CPT 
  
  
Labs:  
  
       
Recent Labs  
  05/26/20 
0345 05/25/20 
8780 WBC 10.0 12.0*  
HGB 11.1* 12.9 HCT 34.0* 39.4  425*  
  
       
Recent Labs  
  05/26/20 
0345 05/25/20 
6035  136  
K 3.3* 3.7  103 CO2 26 29 BUN 12 14 CREA 0.86 0.93 * 235* CA 9.2 10.1  
  
       
Recent Labs  
  05/26/20 
0345 05/25/20 
1103 SGOT 16 14* ALT 21 25 AP 87 120* TBILI 0.6 0.6 TP 7.3 8.7* ALB 3.2* 3.8 GLOB 4.1* 4.9*  
  
No results for input(s): INR, PTP, APTT, INREXT in the last 72 hours.  No results for input(s): FE, TIBC, PSAT, FERR in the last 72 hours.  No results found for: FOL, RBCF  
No results for input(s): PH, PCO2, PO2 in the last 72 hours. No results for input(s): CPK, CKNDX, TROIQ in the last 72 hours. 
  
No lab exists for component: CPKMB No results found for: CHOL, CHOLX, CHLST, CHOLV, HDL, HDLP, LDL, LDLC, DLDLP, TGLX, TRIGL, TRIGP, CHHD, CHHDX 
         
Lab Results Component Value Date/Time  
  Glucose (POC) 122 (H) 05/26/2020 06:35 AM  
  Glucose (POC) 133 (H) 05/25/2020 09:26 PM  
  Glucose (POC) 133 (H) 05/25/2020 04:53 PM  
  
No results found for: COLOR, APPRN, SPGRU, REFSG, PAM, PROTU, GLUCU, KETU, BILU, UROU, JANETH, LEUKU, GLUKE, EPSU, BACTU, WBCU, RBCU, CASTS, UCRY 
  
 WOCN Note:  
  
New consult for right foot wound. 
  
Chart shows: 
Admitted for right foot wound post nail puncture. History of DM & neuropathy WBC = 10 and he is on Zosyn & vancomycin Admitted from home. Podiatry has been consulted.  
  
Assessment:  
A&O x 4 and reports no pain - tolerates wound cleaning with no discomfort. Mobile and continent. 
  
Strong aplpable DP pulse in right foot with slight edema, no calor, or redness.  
  
1. POA, right plantar foot puncture wound = 1 x 1 x 0.5 cm  100% devitalized fibrinous tissue with scant amount of purulent exudate. NO odor.   
Cleaned will with peroxide and area deroofed itself of thick skin to reveal the above described base.  Dry gauze applied until he is seen by podiatry.  
  
Recommendations:   
Right foot: Santyl daily with cover dressing.  Keep covered at all times. Daily foot inspections using a mirror.  
Tight blood sugar control.  
  
 Transition of Care: Plan to follow as needed while admitted to hospital. Available as needed by podiatry and will wait for their consult before entering order for Santyl .  
  
RAHUL Wright, RN, [facility] Certified Wound, Ostomy, Continence Nurse 
office [phone] 
pager [phone] or call  to page 
  
[phone]: see note that Dr. Michael Polanco, podiatry, is now managing foot wound. [facility] Dept is available as needed - please reconsult. AI Wright 
   
  
Revision History   
  
         
         
         
         
Foot and Ankle Surgery Consult 
  
Subjective:  
  
Yoon Blake is a 71 y. o. male who presents for evaluation of his right foot   He stepped on a nail and has pain along the lateral aspect of the right foot Physical Exam: 
There is a puncture wound plantar lateral aspect of the right foot   Depth is SQ (18mm)  The diameter is 4mm    No erythema  No draionage  No odor minimal edema  He has pain with palpation to the peroneal tendons and has decreased active ROM in plantarflexion of the 1st ray   
Assessment:  
  
                
                     
Hospital Problems  Date Reviewed: 5/26/2020  
        Codes Class Noted POA  
  Open wound of right foot ICD-10-CM: E58.192H ICD-9-CM: 892.0   5/25/2020 Unknown  
     
   
  
  
Plan:  
  
Probed the open wound >> applied beatdine wet to dry dressing Will get an MRI to r/o peroneous Longus tear 
 5/26 Tyl 650 mg po q4 prn x1, Norvasc 10 mg po qd, Lipiotr 40 mg po qd, Coreg 12.5 mg po bid, neurontin 300 mg po qd, Lantus 25 u sc qhs, Humalog ssi qid,   Lisinopril 5 mg po qd, Zosyn 3.375 g iv q8, Xarelto 20 mg po qd, Vanco 1500 mg iv q16, K 40 meq po q4, Percocoet 5-325 mg po q6 prn x1,  
  
Blood cx still pending

## 2020-05-28 LAB
ANION GAP SERPL CALC-SCNC: 5 MMOL/L (ref 5–15)
BUN SERPL-MCNC: 8 MG/DL (ref 6–20)
BUN/CREAT SERPL: 11 (ref 12–20)
CALCIUM SERPL-MCNC: 9.4 MG/DL (ref 8.5–10.1)
CHLORIDE SERPL-SCNC: 109 MMOL/L (ref 97–108)
CO2 SERPL-SCNC: 26 MMOL/L (ref 21–32)
CREAT SERPL-MCNC: 0.76 MG/DL (ref 0.7–1.3)
DATE LAST DOSE: NORMAL
ERYTHROCYTE [DISTWIDTH] IN BLOOD BY AUTOMATED COUNT: 11.8 % (ref 11.5–14.5)
GLUCOSE BLD STRIP.AUTO-MCNC: 117 MG/DL (ref 65–100)
GLUCOSE BLD STRIP.AUTO-MCNC: 131 MG/DL (ref 65–100)
GLUCOSE BLD STRIP.AUTO-MCNC: 153 MG/DL (ref 65–100)
GLUCOSE BLD STRIP.AUTO-MCNC: 91 MG/DL (ref 65–100)
GLUCOSE SERPL-MCNC: 168 MG/DL (ref 65–100)
HCT VFR BLD AUTO: 34.1 % (ref 36.6–50.3)
HGB BLD-MCNC: 11.1 G/DL (ref 12.1–17)
MCH RBC QN AUTO: 28.2 PG (ref 26–34)
MCHC RBC AUTO-ENTMCNC: 32.6 G/DL (ref 30–36.5)
MCV RBC AUTO: 86.5 FL (ref 80–99)
NRBC # BLD: 0 K/UL (ref 0–0.01)
NRBC BLD-RTO: 0 PER 100 WBC
PLATELET # BLD AUTO: 382 K/UL (ref 150–400)
PMV BLD AUTO: 9 FL (ref 8.9–12.9)
POTASSIUM SERPL-SCNC: 3.9 MMOL/L (ref 3.5–5.1)
RBC # BLD AUTO: 3.94 M/UL (ref 4.1–5.7)
REPORTED DOSE,DOSE: NORMAL UNITS
REPORTED DOSE/TIME,TMG: NORMAL
SERVICE CMNT-IMP: ABNORMAL
SERVICE CMNT-IMP: NORMAL
SODIUM SERPL-SCNC: 140 MMOL/L (ref 136–145)
VANCOMYCIN TROUGH SERPL-MCNC: 6.7 UG/ML (ref 5–10)
WBC # BLD AUTO: 7.5 K/UL (ref 4.1–11.1)

## 2020-05-28 PROCEDURE — 74011636637 HC RX REV CODE- 636/637: Performed by: HOSPITALIST

## 2020-05-28 PROCEDURE — 85027 COMPLETE CBC AUTOMATED: CPT

## 2020-05-28 PROCEDURE — 82962 GLUCOSE BLOOD TEST: CPT

## 2020-05-28 PROCEDURE — 74011250636 HC RX REV CODE- 250/636: Performed by: HOSPITALIST

## 2020-05-28 PROCEDURE — 97116 GAIT TRAINING THERAPY: CPT

## 2020-05-28 PROCEDURE — 80202 ASSAY OF VANCOMYCIN: CPT

## 2020-05-28 PROCEDURE — 74011250637 HC RX REV CODE- 250/637: Performed by: HOSPITALIST

## 2020-05-28 PROCEDURE — 65270000032 HC RM SEMIPRIVATE

## 2020-05-28 PROCEDURE — 97161 PT EVAL LOW COMPLEX 20 MIN: CPT

## 2020-05-28 PROCEDURE — 36415 COLL VENOUS BLD VENIPUNCTURE: CPT

## 2020-05-28 PROCEDURE — 80048 BASIC METABOLIC PNL TOTAL CA: CPT

## 2020-05-28 PROCEDURE — 74011000258 HC RX REV CODE- 258: Performed by: HOSPITALIST

## 2020-05-28 RX ORDER — VANCOMYCIN/0.9 % SOD CHLORIDE 1.5G/250ML
1500 PLASTIC BAG, INJECTION (ML) INTRAVENOUS EVERY 12 HOURS
Status: DISCONTINUED | OUTPATIENT
Start: 2020-05-29 | End: 2020-05-30 | Stop reason: HOSPADM

## 2020-05-28 RX ORDER — LABETALOL 100 MG/1
200 TABLET, FILM COATED ORAL 2 TIMES DAILY
Status: DISCONTINUED | OUTPATIENT
Start: 2020-05-28 | End: 2020-05-28

## 2020-05-28 RX ADMIN — ACETAMINOPHEN 1000 MG: 500 TABLET, FILM COATED ORAL at 04:20

## 2020-05-28 RX ADMIN — PIPERACILLIN AND TAZOBACTAM 3.38 G: 3; .375 INJECTION, POWDER, FOR SOLUTION INTRAVENOUS at 04:20

## 2020-05-28 RX ADMIN — VANCOMYCIN HYDROCHLORIDE 1500 MG: 10 INJECTION, POWDER, LYOPHILIZED, FOR SOLUTION INTRAVENOUS at 19:14

## 2020-05-28 RX ADMIN — INSULIN GLARGINE 25 UNITS: 100 INJECTION, SOLUTION SUBCUTANEOUS at 00:20

## 2020-05-28 RX ADMIN — VANCOMYCIN HYDROCHLORIDE 1500 MG: 10 INJECTION, POWDER, LYOPHILIZED, FOR SOLUTION INTRAVENOUS at 04:20

## 2020-05-28 RX ADMIN — LISINOPRIL 5 MG: 5 TABLET ORAL at 10:47

## 2020-05-28 RX ADMIN — GABAPENTIN 300 MG: 300 CAPSULE ORAL at 10:47

## 2020-05-28 RX ADMIN — ACETAMINOPHEN 1000 MG: 500 TABLET, FILM COATED ORAL at 21:46

## 2020-05-28 RX ADMIN — CARVEDILOL 12.5 MG: 12.5 TABLET, FILM COATED ORAL at 07:00

## 2020-05-28 RX ADMIN — CARVEDILOL 12.5 MG: 12.5 TABLET, FILM COATED ORAL at 16:48

## 2020-05-28 RX ADMIN — ATORVASTATIN CALCIUM 40 MG: 40 TABLET, FILM COATED ORAL at 10:48

## 2020-05-28 RX ADMIN — AMLODIPINE BESYLATE 10 MG: 5 TABLET ORAL at 10:47

## 2020-05-28 RX ADMIN — INSULIN GLARGINE 25 UNITS: 100 INJECTION, SOLUTION SUBCUTANEOUS at 21:46

## 2020-05-28 RX ADMIN — RIVAROXABAN 20 MG: 20 TABLET, FILM COATED ORAL at 10:48

## 2020-05-28 RX ADMIN — PIPERACILLIN AND TAZOBACTAM 3.38 G: 3; .375 INJECTION, POWDER, FOR SOLUTION INTRAVENOUS at 10:51

## 2020-05-28 RX ADMIN — INSULIN LISPRO 2 UNITS: 100 INJECTION, SOLUTION INTRAVENOUS; SUBCUTANEOUS at 12:21

## 2020-05-28 RX ADMIN — PIPERACILLIN AND TAZOBACTAM 3.38 G: 3; .375 INJECTION, POWDER, FOR SOLUTION INTRAVENOUS at 19:13

## 2020-05-28 NOTE — PROGRESS NOTES
2330:  Pt blood sugar was 109. Pt had 25 units of Lantus ordered. Perfect Served NP. Orders were given to give 25 units of Lantus. 8324:  Pt had orders for a Vanc trough to be drawn at 0300 before Vanc given. Contacted pharmacy because computers were down and pharmacy said to only draw STAT labs until system comes back up. Pharmacy said not to draw the Vanc trough at this time. They would get it another time. 0630 Morning blood sugar was 131. No insulin given. Bedside shift change report given to Deb Galindo RN (oncoming nurse) by Rogelio Washington RN (offgoing nurse). Report included the following information SBAR, Kardex, Intake/Output and MAR.

## 2020-05-28 NOTE — PROGRESS NOTES
Bedside shift change report given to Murali Lo RN (oncoming nurse) by Ingrid Lay RN (offgoing nurse). Report included the following information SBAR and Kardex.

## 2020-05-28 NOTE — PROGRESS NOTES
Patient inquiring when Podiatrist would be rounding. On review of chart, no note from Podiatrist noted for yesterday or today. RN called Dr. Sola Hobbs office but office now closed. Will communicate in report to follow up in a.m. if needed.

## 2020-05-28 NOTE — PROGRESS NOTES
Telephone order received from Dr. Denisha Acharya for SONY bilateral lower extremity. MD states that Podiatry will round on patient either late tonight or early tomorrow.

## 2020-05-28 NOTE — PROGRESS NOTES
Problem: Mobility Impaired (Adult and Pediatric)  Goal: *Acute Goals and Plan of Care (Insert Text)  Description: FUNCTIONAL STATUS PRIOR TO ADMISSION: Patient was modified independent using a single point cane for functional mobility. Pt owns a rolling walker. HOME SUPPORT PRIOR TO ADMISSION: The patient lived with his son but did not require assist.    Physical Therapy Goals  Initiated 5/28/2020  1. Patient will move from supine to sit and sit to supine  in bed with modified independence within 7 day(s). 2.  Patient will transfer from bed to chair and chair to bed with modified independence using the least restrictive device within 7 day(s). 3.  Patient will perform sit to stand with modified independence within 7 day(s). 4.  Patient will ambulate with modified independence for 75 feet with the least restrictive device within 7 day(s). 5.  Patient will ascend/descend 3 stairs with bilateral handrail(s) with supervision/set-up within 7 day(s).     5/28/2020 1412 by Brett Healy PT  Outcome: Progressing Towards Goal   PHYSICAL THERAPY EVALUATION  Patient: Ashley Velazco (5 y.o. male)  Date: 5/28/2020  Primary Diagnosis: Open wound of right foot [S91.301A]        Precautions:   Fall      ASSESSMENT  Based on the objective data described below, the patient presents with impaired balance and gait s/p admission on 5/25/2020 for R plantar heel wound. Kept pt toe touch weight bearing on the R since there were no weight bearing restrictions or orders indicated in the chart. Pt completed transfers with [facility] and standby assist. Pt tolerated gait training x 15 ft with cane on the R and required CGA-min assist due to impaired gait mechanics. Instructed pt to place cane on the L side and noted improvement in balance and gait mechanics; however do believe he will do even better with a RW. Pt will continue to benefit from PT to progress mobility, improve balance and gait with use of RW.  Anticipate no PT needs upon discharge pending progress and hospital course. Current Level of Function Impacting Discharge (mobility/balance): CGA-min A gait training x 35 ft with [facility]    Functional Outcome Measure: The patient scored Total Score: 22/28 on the Tinetti outcome measure which is indicative of moderate fall risk. Patient will benefit from skilled therapy intervention to address the above noted impairments. PLAN :  Recommendations and Planned Interventions: bed mobility training, transfer training, gait training, therapeutic exercises, neuromuscular re-education, edema management/control, patient and family training/education, and therapeutic activities      Frequency/Duration: Patient will be followed by physical therapy:  5 times a week to address goals. Recommendation for discharge: (in order for the patient to meet his/her long term goals)  To be determined: Anticipate no needs              SUBJECTIVE:   Patient stated I can use the walker when I get home.     OBJECTIVE DATA SUMMARY:   HISTORY:    Past Medical History:   Diagnosis Date    Diabetes ([address]     Hypertension      Past Surgical History:   Procedure Laterality Date    HX OTHER SURGICAL      cyst removal to back       Personal factors and/or comorbidities impacting plan of care:     Home Situation  Home Environment: Private residence  # Steps to Enter: 3  Rails to Enter: Yes  Hand Rails : Bilateral  One/Two Story Residence: One story  Living Alone: No  Support Systems: Child(zen)  Patient Expects to be Discharged to[de-identified] Private residence  Current DME Used/Available at Home: Redgie Blocker, straight, Walker, rolling    EXAMINATION/PRESENTATION/DECISION MAKING:   Critical Behavior:  Neurologic State: Alert  Orientation Level: Oriented X4        Hearing: Auditory  Auditory Impairment: None  Skin:    Edema:   Range Of Motion:  AROM: Within functional limits                       Strength:    Strength:  Within functional limits                    Tone & Sensation:                  Sensation: Impaired               Coordination:     Vision:      Functional Mobility:  Bed Mobility:     Supine to Sit: Supervision     Scooting: Supervision  Transfers:  Sit to Stand: Stand-by assistance  Stand to Sit: Stand-by assistance                       Balance:   Sitting: Intact  Standing: Impaired  Standing - Static: Good  Standing - Dynamic : Fair  Ambulation/Gait Training:  Distance (ft): 25 Feet (ft)  Assistive Device: Gait belt;Cane, straight  Ambulation - Level of Assistance: Contact guard assistance        Gait Abnormalities: Decreased step clearance; Step to gait; Antalgic        Base of Support: Widened;Shift to left  Stance: Right decreased  Speed/Cinthia: Pace decreased (<100 feet/min)  Step Length: Right shortened;Left shortened         Functional Measure:  Tinetti test:    Sitting Balance: 1  Arises: 1  Attempts to Rise: 2  Immediate Standing Balance: 1  Standing Balance: 1  Nudged: 2  Eyes Closed: 1  Turn 360 Degrees - Continuous/Discontinuous: 1  Turn 360 Degrees - Steady/Unsteady: 1  Sitting Down: 1  Balance Score: 12 Balance total score  Indication of Gait: 1  R Step Length/Height: 1  L Step Length/Height: 1  R Foot Clearance: 1  L Foot Clearance: 1  Step Symmetry: 1  Step Continuity: 1  Path: 1  Trunk: 1  Walking Time: 1  Gait Score: 10 Gait total score  Total Score: 22/28 Overall total score         Tinetti Tool Score Risk of Falls  <19 = High Fall Risk  19-24 = Moderate Fall Risk  25-28 = Low Fall Risk  Tinetti ME. Performance-Oriented Assessment of Mobility Problems in Elderly Patients. [address] 66; [id number].  (Scoring Description: PT Bulletin Feb. 10, 1993)    Older adults: Cindy Arellano et al, 2009; n = 1000 [facility] elderly evaluated with ABC, TEA, ADL, and IADL)  · Mean TEA score for males aged 69-68 years = 26.21(3.40)  · Mean TEA score for females age 69-68 years = 25.16(4.30)  · Mean TEA score for males over 80 years = 23.29(6.02)  · Mean TEA score for females over 80 years = 17.20(8.32)        Based on the above components, the patient evaluation is determined to be of the following complexity level: LOW     Pain Rating:  Pt c/o minimal pain R foot    Activity Tolerance:   Good  Please refer to the flowsheet for vital signs taken during this treatment. After treatment patient left in no apparent distress:   Supine in bed, Call bell within reach, and Side rails x 3    COMMUNICATION/EDUCATION:   The patients plan of care was discussed with: Registered nurse. Fall prevention education was provided and the patient/caregiver indicated understanding., Patient/family have participated as able in goal setting and plan of care. , and Patient/family agree to work toward stated goals and plan of care.     Thank you for this referral.  Alvarez Peters, PT, DPT   Time Calculation: 20 mins

## 2020-05-28 NOTE — PROGRESS NOTES
[facility] Adult  Hospitalist Group                                                                                          Hospitalist Progress Note  Torsten Dorantes NP  Answering service: [phone] OR [phone] from in house phone        Date of Service:  2020  NAME:  Leah Stewart  :  1950  MRN:  [id number]      Admission Summary:      This is a 51-year-old  male with past medical history significant for type 2 diabetes mellitus, hypertension, peripheral neuropathy, AFib, and cardiomyopathy, who presented to [facility] Emergency Department with right foot pain and swelling, 3-4 days' duration.       Interval history / Subjective:    Seen and examined patient. States that he feels good and had a great night of sleep. Is not having any pain in the right foot at this time. Dressing is dry and intact. Denies any over night events. No Acute distress noted. Denies any dizziness, syncope, shortness of breath, chest pain or tightness, nausea, vomiting, or diarrhea. Assessment & Plan:     Right foot puncture wound and cellulitis with possible abscess.  -failed outpatient abx treatment   -continue iv zosyn and vancomycin  -x ray of foot no acute abnormality   -wound cx no growth so far  - Blood Cultures - NGTD Day # 3   -MRI  result: 1. Small fluid collection in the subcutaneous tissues of the plantar aspect of  the heel towards the midfoot. Several locules of gas are seen within this. Findings are concerning for a small abscess. No evidence of osteomyelitis.   2. Small osteochondral lesion of the lateral talar dome.  -wound care nurse on board  -podiatrist on board     Type 2DM with hyperglycemia  -A1c 7.1  - Insulin sliding scale  - Monitor blood glucose   - Diabetic diet      HTN  -BP normal, continue norvasc, coreg and lisinopril, monitor BP     Peripheral neuropathy   -stable  -continue home gabapentin      Hypokalemia  - Resolved  -Potassium 3.6 this am - Monitor labs      Hx of Paroxysmal A Fib, rate controlled  -continue coreg and xarelto     Hyperlipidemia.  -continue lipitor     Hx of cardiomyopathy.  -compensated, continue lisinopril and coreg  -monitor I/O and daily weight      Code status: Full   DVT prophylaxis: [facility] discussed with: Patient/Family and Nurse  Anticipated Disposition: Home w/Family  Anticipated Discharge: 24 hours to 48 hours     Hospital Problems  Date Reviewed: 5/26/2020          Codes Class Noted POA    Open wound of right foot ICD-10-CM: S91.301A  ICD-9-CM: 892.0  5/25/2020 Unknown                Review of Systems:   A comprehensive review of systems was negative except for that written in the HPI. Vital Signs:    Last 24hrs VS reviewed since prior progress note. Most recent are:  Visit Vitals  BP (!) 168/92 (BP 1 Location: Right arm)   Pulse 80   Temp 98.1 °F (36.7 °C)   Resp 17   Ht 6' (1.829 m)   Wt 96.2 kg (212 lb)   SpO2 97%   BMI 28.75 kg/m²         Intake/Output Summary (Last 24 hours) at 5/28/2020 1230  Last data filed at 5/28/2020 1051  Gross per 24 hour   Intake    Output 3100 ml   Net -3100 ml        Physical Examination:             Constitutional:  No acute distress, cooperative, pleasant, answers all questions and follows commands appropriately    ENT:  Oral mucosa moist, oropharynx benign. Resp:  CTA bilaterally. No wheezing/rhonchi/rales. No accessory muscle use   CV:  Regular rhythm, normal rate, no murmurs, gallops, rubs    GI:  Soft, non distended, non tender. normoactive bowel sounds,    Musculoskeletal:  No edema, warm, 2+ pulses throughout    Neurologic:  Moves all extremities. AAOx3, CN II-XII reviewed     Psych:  Good insight, Not anxious nor agitated.   Skin:  Good turgor, no rashes or ulcers       Data Review:    Review and/or order of clinical lab test      Labs:     Recent Labs     05/28/20  1125 05/26/20  0345   WBC 7.5 10.0   HGB 11.1* 11.1*   HCT 34.1* 34.0*    387 Recent Labs     05/28/20  1125 05/27/20  0301 05/26/20  0345    138 136   K 3.9 3.9 3.3*   * 107 104   CO2 26 25 26   BUN 8 11 12   CREA 0.76 0.82 0.86   * 185* 120*   CA 9.4 9.1 9.2     Recent Labs     05/26/20  0345   ALT 21   AP 87   TBILI 0.6   TP 7.3   ALB 3.2*   GLOB 4.1*     No results for input(s): INR, PTP, APTT, INREXT in the last 72 hours. No results for input(s): FE, TIBC, PSAT, FERR in the last 72 hours. No results found for: FOL, RBCF   No results for input(s): PH, PCO2, PO2 in the last 72 hours. No results for input(s): CPK, CKNDX, TROIQ in the last 72 hours. No lab exists for component: CPKMB  No results found for: CHOL, CHOLX, CHLST, CHOLV, HDL, HDLP, LDL, LDLC, DLDLP, TGLX, TRIGL, TRIGP, CHHD, CHHDX  Lab Results   Component Value Date/Time    Glucose (POC) 153 (H) 05/28/2020 11:25 AM    Glucose (POC) 131 (H) 05/28/2020 06:39 AM    Glucose (POC) 109 (H) 05/27/2020 09:43 PM    Glucose (POC) 106 (H) 05/27/2020 04:40 PM    Glucose (POC) 169 (H) 05/27/2020 11:25 AM     No results found for: COLOR, APPRN, SPGRU, REFSG, PAM, PROTU, GLUCU, KETU, BILU, UROU, JANETH, LEUKU, GLUKE, EPSU, BACTU, WBCU, RBCU, CASTS, UCRY      Medications Reviewed:     Current Facility-Administered Medications   Medication Dose Route Frequency    Vancomycin, Trough - Please draw IMMEDIATELY PRIOR to starting 2000 dose on Thursday, 5/28. Thanks!    Other ONCE    acetaminophen (TYLENOL) tablet 1,000 mg  1,000 mg Oral Q6H PRN    gabapentin (NEURONTIN) capsule 300 mg  300 mg Oral DAILY    amLODIPine (NORVASC) tablet 10 mg  10 mg Oral DAILY    carvediloL (COREG) tablet 12.5 mg  12.5 mg Oral BID WITH MEALS    lisinopriL (PRINIVIL, ZESTRIL) tablet 5 mg  5 mg Oral DAILY    sodium chloride (NS) flush 5-40 mL  5-40 mL IntraVENous Q8H    sodium chloride (NS) flush 5-40 mL  5-40 mL IntraVENous PRN    insulin glargine (LANTUS) injection 25 Units  25 Units SubCUTAneous QHS    insulin lispro (HUMALOG) injection   SubCUTAneous AC&HS    glucose chewable tablet 16 g  4 Tab Oral PRN    glucagon (GLUCAGEN) injection 1 mg  1 mg IntraMUSCular PRN    Vancomycin Pharmacy Dosing   Other PRN    vancomycin (VANCOCIN) 1500 mg in  ml infusion  1,500 mg IntraVENous Q16H    rivaroxaban (XARELTO) tablet 20 mg  20 mg Oral DAILY    dextrose 10 % infusion 125-250 mL  125-250 mL IntraVENous PRN    piperacillin-tazobactam (ZOSYN) 3.375 g in 0.9% sodium chloride (MBP/ADV) 100 mL  3.375 g IntraVENous Q8H    atorvastatin (LIPITOR) tablet 40 mg  40 mg Oral DAILY     ______________________________________________________________________  EXPECTED LENGTH OF STAY: 3d 4h  ACTUAL LENGTH OF STAY:          3                 Jayant Jimenez NP

## 2020-05-29 ENCOUNTER — ANESTHESIA EVENT (OUTPATIENT)
Dept: SURGERY | Age: 70
DRG: 571 | End: 2020-05-29
Payer: MEDICARE

## 2020-05-29 ENCOUNTER — ANESTHESIA (OUTPATIENT)
Dept: SURGERY | Age: 70
DRG: 571 | End: 2020-05-29
Payer: MEDICARE

## 2020-05-29 PROBLEM — L02.611 ABSCESS OF RIGHT FOOT: Status: ACTIVE | Noted: 2020-05-29

## 2020-05-29 LAB
ANION GAP SERPL CALC-SCNC: 6 MMOL/L (ref 5–15)
BUN SERPL-MCNC: 8 MG/DL (ref 6–20)
BUN/CREAT SERPL: 11 (ref 12–20)
CALCIUM SERPL-MCNC: 8.9 MG/DL (ref 8.5–10.1)
CHLORIDE SERPL-SCNC: 109 MMOL/L (ref 97–108)
CO2 SERPL-SCNC: 25 MMOL/L (ref 21–32)
CREAT SERPL-MCNC: 0.75 MG/DL (ref 0.7–1.3)
GLUCOSE BLD STRIP.AUTO-MCNC: 105 MG/DL (ref 65–100)
GLUCOSE BLD STRIP.AUTO-MCNC: 182 MG/DL (ref 65–100)
GLUCOSE BLD STRIP.AUTO-MCNC: 250 MG/DL (ref 65–100)
GLUCOSE BLD STRIP.AUTO-MCNC: 75 MG/DL (ref 65–100)
GLUCOSE BLD STRIP.AUTO-MCNC: 79 MG/DL (ref 65–100)
GLUCOSE SERPL-MCNC: 109 MG/DL (ref 65–100)
POTASSIUM SERPL-SCNC: 3.6 MMOL/L (ref 3.5–5.1)
SERVICE CMNT-IMP: ABNORMAL
SERVICE CMNT-IMP: NORMAL
SERVICE CMNT-IMP: NORMAL
SODIUM SERPL-SCNC: 140 MMOL/L (ref 136–145)

## 2020-05-29 PROCEDURE — 77030011640 HC PAD GRND REM COVD -A: Performed by: PODIATRIST

## 2020-05-29 PROCEDURE — 65270000032 HC RM SEMIPRIVATE

## 2020-05-29 PROCEDURE — 74011250636 HC RX REV CODE- 250/636: Performed by: HOSPITALIST

## 2020-05-29 PROCEDURE — 97116 GAIT TRAINING THERAPY: CPT

## 2020-05-29 PROCEDURE — 74011250637 HC RX REV CODE- 250/637: Performed by: HOSPITALIST

## 2020-05-29 PROCEDURE — 76060000032 HC ANESTHESIA 0.5 TO 1 HR: Performed by: PODIATRIST

## 2020-05-29 PROCEDURE — 80048 BASIC METABOLIC PNL TOTAL CA: CPT

## 2020-05-29 PROCEDURE — 0JBQ0ZZ EXCISION OF RIGHT FOOT SUBCUTANEOUS TISSUE AND FASCIA, OPEN APPROACH: ICD-10-PCS | Performed by: PODIATRIST

## 2020-05-29 PROCEDURE — 87205 SMEAR GRAM STAIN: CPT

## 2020-05-29 PROCEDURE — 74011636637 HC RX REV CODE- 636/637: Performed by: HOSPITALIST

## 2020-05-29 PROCEDURE — 82962 GLUCOSE BLOOD TEST: CPT

## 2020-05-29 PROCEDURE — 76010000138 HC OR TIME 0.5 TO 1 HR: Performed by: PODIATRIST

## 2020-05-29 PROCEDURE — 74011250636 HC RX REV CODE- 250/636: Performed by: NURSE ANESTHETIST, CERTIFIED REGISTERED

## 2020-05-29 PROCEDURE — 77030018836 HC SOL IRR NACL ICUM -A: Performed by: PODIATRIST

## 2020-05-29 PROCEDURE — 74011000258 HC RX REV CODE- 258: Performed by: HOSPITALIST

## 2020-05-29 PROCEDURE — 87075 CULTR BACTERIA EXCEPT BLOOD: CPT

## 2020-05-29 PROCEDURE — 77030002933 HC SUT MCRYL J&J -A: Performed by: PODIATRIST

## 2020-05-29 PROCEDURE — 77030031139 HC SUT VCRL2 J&J -A: Performed by: PODIATRIST

## 2020-05-29 PROCEDURE — 77030008462 HC STPLR SKN PROX J&J -A: Performed by: PODIATRIST

## 2020-05-29 PROCEDURE — 74011000250 HC RX REV CODE- 250: Performed by: PODIATRIST

## 2020-05-29 PROCEDURE — 76210000006 HC OR PH I REC 0.5 TO 1 HR: Performed by: PODIATRIST

## 2020-05-29 PROCEDURE — 36415 COLL VENOUS BLD VENIPUNCTURE: CPT

## 2020-05-29 PROCEDURE — 74011250636 HC RX REV CODE- 250/636: Performed by: ANESTHESIOLOGY

## 2020-05-29 RX ORDER — SODIUM CHLORIDE, SODIUM LACTATE, POTASSIUM CHLORIDE, CALCIUM CHLORIDE 600; 310; 30; 20 MG/100ML; MG/100ML; MG/100ML; MG/100ML
125 INJECTION, SOLUTION INTRAVENOUS CONTINUOUS
Status: DISCONTINUED | OUTPATIENT
Start: 2020-05-29 | End: 2020-05-29 | Stop reason: HOSPADM

## 2020-05-29 RX ORDER — SODIUM CHLORIDE 0.9 % (FLUSH) 0.9 %
5-40 SYRINGE (ML) INJECTION AS NEEDED
Status: DISCONTINUED | OUTPATIENT
Start: 2020-05-29 | End: 2020-05-29 | Stop reason: HOSPADM

## 2020-05-29 RX ORDER — SODIUM CHLORIDE 0.9 % (FLUSH) 0.9 %
5-40 SYRINGE (ML) INJECTION EVERY 8 HOURS
Status: DISCONTINUED | OUTPATIENT
Start: 2020-05-29 | End: 2020-05-29 | Stop reason: HOSPADM

## 2020-05-29 RX ORDER — SODIUM CHLORIDE, SODIUM LACTATE, POTASSIUM CHLORIDE, CALCIUM CHLORIDE 600; 310; 30; 20 MG/100ML; MG/100ML; MG/100ML; MG/100ML
75 INJECTION, SOLUTION INTRAVENOUS CONTINUOUS
Status: DISCONTINUED | OUTPATIENT
Start: 2020-05-29 | End: 2020-05-29 | Stop reason: HOSPADM

## 2020-05-29 RX ORDER — PROPOFOL 10 MG/ML
INJECTION, EMULSION INTRAVENOUS
Status: DISCONTINUED | OUTPATIENT
Start: 2020-05-29 | End: 2020-05-29 | Stop reason: HOSPADM

## 2020-05-29 RX ORDER — SODIUM CHLORIDE, SODIUM LACTATE, POTASSIUM CHLORIDE, CALCIUM CHLORIDE 600; 310; 30; 20 MG/100ML; MG/100ML; MG/100ML; MG/100ML
INJECTION, SOLUTION INTRAVENOUS
Status: DISCONTINUED | OUTPATIENT
Start: 2020-05-29 | End: 2020-05-29 | Stop reason: HOSPADM

## 2020-05-29 RX ORDER — DOXYCYCLINE 100 MG/1
100 CAPSULE ORAL 2 TIMES DAILY
Qty: 20 CAP | Refills: 1 | Status: SHIPPED | OUTPATIENT
Start: 2020-05-29

## 2020-05-29 RX ORDER — HYDROMORPHONE HYDROCHLORIDE 1 MG/ML
0.2 INJECTION, SOLUTION INTRAMUSCULAR; INTRAVENOUS; SUBCUTANEOUS
Status: DISCONTINUED | OUTPATIENT
Start: 2020-05-29 | End: 2020-05-29 | Stop reason: HOSPADM

## 2020-05-29 RX ORDER — MIDAZOLAM HYDROCHLORIDE 1 MG/ML
INJECTION, SOLUTION INTRAMUSCULAR; INTRAVENOUS AS NEEDED
Status: DISCONTINUED | OUTPATIENT
Start: 2020-05-29 | End: 2020-05-29 | Stop reason: HOSPADM

## 2020-05-29 RX ORDER — BUPIVACAINE HYDROCHLORIDE AND EPINEPHRINE 5; 5 MG/ML; UG/ML
INJECTION, SOLUTION EPIDURAL; INTRACAUDAL; PERINEURAL AS NEEDED
Status: DISCONTINUED | OUTPATIENT
Start: 2020-05-29 | End: 2020-05-29 | Stop reason: HOSPADM

## 2020-05-29 RX ORDER — SODIUM CHLORIDE 9 MG/ML
25 INJECTION, SOLUTION INTRAVENOUS CONTINUOUS
Status: DISCONTINUED | OUTPATIENT
Start: 2020-05-29 | End: 2020-05-29 | Stop reason: HOSPADM

## 2020-05-29 RX ORDER — ONDANSETRON 2 MG/ML
4 INJECTION INTRAMUSCULAR; INTRAVENOUS AS NEEDED
Status: DISCONTINUED | OUTPATIENT
Start: 2020-05-29 | End: 2020-05-29 | Stop reason: HOSPADM

## 2020-05-29 RX ORDER — FENTANYL CITRATE 50 UG/ML
INJECTION, SOLUTION INTRAMUSCULAR; INTRAVENOUS AS NEEDED
Status: DISCONTINUED | OUTPATIENT
Start: 2020-05-29 | End: 2020-05-29 | Stop reason: HOSPADM

## 2020-05-29 RX ORDER — FENTANYL CITRATE 50 UG/ML
25 INJECTION, SOLUTION INTRAMUSCULAR; INTRAVENOUS
Status: DISCONTINUED | OUTPATIENT
Start: 2020-05-29 | End: 2020-05-29 | Stop reason: HOSPADM

## 2020-05-29 RX ORDER — LIDOCAINE HYDROCHLORIDE 10 MG/ML
0.1 INJECTION, SOLUTION EPIDURAL; INFILTRATION; INTRACAUDAL; PERINEURAL AS NEEDED
Status: DISCONTINUED | OUTPATIENT
Start: 2020-05-29 | End: 2020-05-29 | Stop reason: HOSPADM

## 2020-05-29 RX ORDER — ONDANSETRON 2 MG/ML
INJECTION INTRAMUSCULAR; INTRAVENOUS AS NEEDED
Status: DISCONTINUED | OUTPATIENT
Start: 2020-05-29 | End: 2020-05-29 | Stop reason: HOSPADM

## 2020-05-29 RX ORDER — FENTANYL CITRATE 50 UG/ML
50 INJECTION, SOLUTION INTRAMUSCULAR; INTRAVENOUS AS NEEDED
Status: DISCONTINUED | OUTPATIENT
Start: 2020-05-29 | End: 2020-05-29 | Stop reason: HOSPADM

## 2020-05-29 RX ORDER — MIDAZOLAM HYDROCHLORIDE 1 MG/ML
1 INJECTION, SOLUTION INTRAMUSCULAR; INTRAVENOUS AS NEEDED
Status: DISCONTINUED | OUTPATIENT
Start: 2020-05-29 | End: 2020-05-29 | Stop reason: HOSPADM

## 2020-05-29 RX ORDER — MIDAZOLAM HYDROCHLORIDE 1 MG/ML
0.5 INJECTION, SOLUTION INTRAMUSCULAR; INTRAVENOUS
Status: DISCONTINUED | OUTPATIENT
Start: 2020-05-29 | End: 2020-05-29 | Stop reason: HOSPADM

## 2020-05-29 RX ORDER — PROPOFOL 10 MG/ML
INJECTION, EMULSION INTRAVENOUS AS NEEDED
Status: DISCONTINUED | OUTPATIENT
Start: 2020-05-29 | End: 2020-05-29 | Stop reason: HOSPADM

## 2020-05-29 RX ORDER — MORPHINE SULFATE 10 MG/ML
2 INJECTION, SOLUTION INTRAMUSCULAR; INTRAVENOUS
Status: DISCONTINUED | OUTPATIENT
Start: 2020-05-29 | End: 2020-05-29 | Stop reason: HOSPADM

## 2020-05-29 RX ORDER — SODIUM CHLORIDE 9 MG/ML
50 INJECTION, SOLUTION INTRAVENOUS CONTINUOUS
Status: DISCONTINUED | OUTPATIENT
Start: 2020-05-29 | End: 2020-05-29 | Stop reason: HOSPADM

## 2020-05-29 RX ORDER — DEXAMETHASONE SODIUM PHOSPHATE 4 MG/ML
INJECTION, SOLUTION INTRA-ARTICULAR; INTRALESIONAL; INTRAMUSCULAR; INTRAVENOUS; SOFT TISSUE AS NEEDED
Status: DISCONTINUED | OUTPATIENT
Start: 2020-05-29 | End: 2020-05-29 | Stop reason: HOSPADM

## 2020-05-29 RX ORDER — DIPHENHYDRAMINE HYDROCHLORIDE 50 MG/ML
12.5 INJECTION, SOLUTION INTRAMUSCULAR; INTRAVENOUS AS NEEDED
Status: DISCONTINUED | OUTPATIENT
Start: 2020-05-29 | End: 2020-05-29 | Stop reason: HOSPADM

## 2020-05-29 RX ADMIN — GABAPENTIN 300 MG: 300 CAPSULE ORAL at 10:22

## 2020-05-29 RX ADMIN — ACETAMINOPHEN 1000 MG: 500 TABLET, FILM COATED ORAL at 21:20

## 2020-05-29 RX ADMIN — PROPOFOL 80 MCG/KG/MIN: 10 INJECTION, EMULSION INTRAVENOUS at 17:48

## 2020-05-29 RX ADMIN — CARVEDILOL 12.5 MG: 12.5 TABLET, FILM COATED ORAL at 07:16

## 2020-05-29 RX ADMIN — AMLODIPINE BESYLATE 10 MG: 5 TABLET ORAL at 10:21

## 2020-05-29 RX ADMIN — SODIUM CHLORIDE, SODIUM LACTATE, POTASSIUM CHLORIDE, AND CALCIUM CHLORIDE 125 ML/HR: 600; 310; 30; 20 INJECTION, SOLUTION INTRAVENOUS at 17:25

## 2020-05-29 RX ADMIN — PROPOFOL 100 MG: 10 INJECTION, EMULSION INTRAVENOUS at 17:48

## 2020-05-29 RX ADMIN — PIPERACILLIN AND TAZOBACTAM 3.38 G: 3; .375 INJECTION, POWDER, FOR SOLUTION INTRAVENOUS at 03:09

## 2020-05-29 RX ADMIN — INSULIN GLARGINE 25 UNITS: 100 INJECTION, SOLUTION SUBCUTANEOUS at 22:55

## 2020-05-29 RX ADMIN — SODIUM CHLORIDE, POTASSIUM CHLORIDE, SODIUM LACTATE AND CALCIUM CHLORIDE: 600; 310; 30; 20 INJECTION, SOLUTION INTRAVENOUS at 17:30

## 2020-05-29 RX ADMIN — RIVAROXABAN 20 MG: 20 TABLET, FILM COATED ORAL at 10:21

## 2020-05-29 RX ADMIN — VANCOMYCIN HYDROCHLORIDE 1500 MG: 10 INJECTION, POWDER, LYOPHILIZED, FOR SOLUTION INTRAVENOUS at 17:56

## 2020-05-29 RX ADMIN — INSULIN LISPRO 2 UNITS: 100 INJECTION, SOLUTION INTRAVENOUS; SUBCUTANEOUS at 13:16

## 2020-05-29 RX ADMIN — ONDANSETRON HYDROCHLORIDE 4 MG: 2 INJECTION, SOLUTION INTRAMUSCULAR; INTRAVENOUS at 17:56

## 2020-05-29 RX ADMIN — MIDAZOLAM 2 MG: 1 INJECTION INTRAMUSCULAR; INTRAVENOUS at 17:40

## 2020-05-29 RX ADMIN — LISINOPRIL 5 MG: 5 TABLET ORAL at 10:22

## 2020-05-29 RX ADMIN — VANCOMYCIN HYDROCHLORIDE 1500 MG: 10 INJECTION, POWDER, LYOPHILIZED, FOR SOLUTION INTRAVENOUS at 06:35

## 2020-05-29 RX ADMIN — PROPOFOL 50 MG: 10 INJECTION, EMULSION INTRAVENOUS at 18:10

## 2020-05-29 RX ADMIN — PIPERACILLIN AND TAZOBACTAM 3.38 G: 3; .375 INJECTION, POWDER, FOR SOLUTION INTRAVENOUS at 10:31

## 2020-05-29 RX ADMIN — ATORVASTATIN CALCIUM 40 MG: 40 TABLET, FILM COATED ORAL at 10:21

## 2020-05-29 RX ADMIN — INSULIN LISPRO 3 UNITS: 100 INJECTION, SOLUTION INTRAVENOUS; SUBCUTANEOUS at 22:55

## 2020-05-29 RX ADMIN — FENTANYL CITRATE 100 MCG: 50 INJECTION, SOLUTION INTRAMUSCULAR; INTRAVENOUS at 17:48

## 2020-05-29 RX ADMIN — DEXAMETHASONE SODIUM PHOSPHATE 4 MG: 4 INJECTION, SOLUTION INTRAMUSCULAR; INTRAVENOUS at 17:55

## 2020-05-29 NOTE — PERIOP NOTES
Spoke with Dr. Nazanin Vasquez concerning order for an ankle rachial index.  New order for 5/30/20 am

## 2020-05-29 NOTE — PROGRESS NOTES
Pharmacist Note - Vancomycin Dosing  Therapy day 4  Indication: right foot puncture wound   Current regimen: 1500 mg IV Q16H    A Trough Level resulted at 6.7 mcg/mL which was obtained 15 hrs post-dose. Goal trough: 10 - 15 mcg/mL     Plan: Change to 1500 mg IV Q12H . Pharmacy will continue to monitor this patient daily for changes in clinical status and renal function.

## 2020-05-29 NOTE — PROGRESS NOTES
Patient post op I&D abscess right heel    From a podiatry standpoint this patient can be discharged in the am with rx of doxycycline 100mg to take one bid x10 days    Please see wound care orders to carry out while he is hospitalized.   Dressings to be changed daily    He is to follow in my office with in 5-7 days of discharge    Case management orders have been placed for home health

## 2020-05-29 NOTE — PROGRESS NOTES
[facility] Adult  Hospitalist Group                                                                                          Hospitalist Progress Note  Jeanne Stone NP  Answering service: [phone] OR [phone] from in house phone        Date of Service:  2020  NAME:  Shana Gomez  :    MRN:  [id number]      Admission Summary: This is a 44-year-old [facility] American male with past medical history significant for type 2 diabetes mellitus, hypertension, peripheral neuropathy, AFib, and cardiomyopathy, who presented to [facility] Emergency Department with right foot pain and swelling, 3-4 days' duration.     Interval history / Subjective:    Seen and examined patient. States that he is feeling good and he was told that he is going to the OR around 5 pm today. NPO. Denies any pain. Dressing on right foot dry and intact. States that he does not have any sensation in his feet and he didn't realize that he stepped on the nail until he was taking off his shoes. He walks around the inside of his house with no protective foot wear. We discussed the importance of him wearing shoes or house shoes to protect his feet in the future. No over night events noted. No acute distress noted   Denies any dizziness, syncope, shortness of breath, chest pain or tightness, nausea, vomiting, or diarrhea. Assessment & Plan:     Right foot puncture wound and cellulitis with possible abscess.  -failed outpatient abx treatment   -continue iv zosyn and vancomycin  -x ray of foot no acute abnormality   -wound cx no growth so far  - Blood Cultures - NGTD Day # 4  -MRI  result: 1. Small fluid collection in the subcutaneous tissues of the plantar aspect of  the heel towards the midfoot. Several locules of gas are seen within this. Findings are concerning for a small abscess. No evidence of osteomyelitis.   2. Small osteochondral lesion of the lateral talar dome.  -wound care nurse on board  -podiatrist on board  - For OR today      Type 2DM with hyperglycemia  -A1c 7.1  - Insulin sliding scale  - Monitor blood glucose   - Diabetic diet   - Diabetic foot care discussed      HTN  -BP normal, continue norvasc, coreg and lisinopril, monitor BP     Peripheral neuropathy   -stable  -continue home gabapentin      Hypokalemia  - Resolved  -Potassium 3.6 this am   - Monitor labs      Hx of Paroxysmal A Fib, rate controlled  -continue coreg and xarelto     Hyperlipidemia.  -continue lipitor     Hx of cardiomyopathy.  -compensated, continue lisinopril and coreg  -monitor I/O and daily weight        Code status: Full   DVT prophylaxis: [facility] discussed with: Patient/Family and Nurse  Anticipated Disposition: Home w/Family  Anticipated Discharge: Greater than 48 hours     Hospital Problems  Date Reviewed: 5/26/2020          Codes Class Noted POA    Open wound of right foot ICD-10-CM: S91.301A  ICD-9-CM: 892.0  5/25/2020 Unknown                Review of Systems:   A comprehensive review of systems was negative except for that written in the HPI. Vital Signs:    Last 24hrs VS reviewed since prior progress note. Most recent are:  Visit Vitals  /89 (BP 1 Location: Right arm, BP Patient Position: At rest)   Pulse 72   Temp 98.4 °F (36.9 °C)   Resp 16   Ht 6' (1.829 m)   Wt 96.2 kg (212 lb)   SpO2 98%   BMI 28.75 kg/m²         Intake/Output Summary (Last 24 hours) at 5/29/2020 1319  Last data filed at 5/28/2020 2316  Gross per 24 hour   Intake 240 ml   Output 1260 ml   Net -1020 ml        Physical Examination:             Constitutional:  No acute distress, cooperative, pleasant, answers all questions and follows commands appropriately    ENT:  Oral mucosa moist, oropharynx benign. Resp:  CTA bilaterally. No wheezing/rhonchi/rales. No accessory muscle use   CV:  Regular rhythm, normal rate, no murmurs, gallops, rubs    GI:  Soft, non distended, non tender.  normoactive bowel sounds, no hepatosplenomegaly     Musculoskeletal:  No edema, warm. Dressing noted to right foot- dry and intact. Neurologic:  Moves all extremities. AAOx3, CN II-XII reviewed     Psych:  Good insight, Not anxious nor agitated. Data Review:    Review and/or order of clinical lab test      Labs:     Recent Labs     05/28/20  1125   WBC 7.5   HGB 11.1*   HCT 34.1*        Recent Labs     05/29/20  0557 05/28/20  1125 05/27/20  0301    140 138   K 3.6 3.9 3.9   * 109* 107   CO2 25 26 25   BUN 8 8 11   CREA 0.75 0.76 0.82   * 168* 185*   CA 8.9 9.4 9.1     No results for input(s): ALT, AP, TBIL, TBILI, TP, ALB, GLOB, GGT, AML, LPSE in the last 72 hours. No lab exists for component: SGOT, GPT, AMYP, HLPSE  No results for input(s): INR, PTP, APTT, INREXT in the last 72 hours. No results for input(s): FE, TIBC, PSAT, FERR in the last 72 hours. No results found for: FOL, RBCF   No results for input(s): PH, PCO2, PO2 in the last 72 hours. No results for input(s): CPK, CKNDX, TROIQ in the last 72 hours.     No lab exists for component: CPKMB  No results found for: CHOL, CHOLX, CHLST, CHOLV, HDL, HDLP, LDL, LDLC, DLDLP, TGLX, TRIGL, TRIGP, CHHD, CHHDX  Lab Results   Component Value Date/Time    Glucose (POC) 182 (H) 05/29/2020 11:15 AM    Glucose (POC) 105 (H) 05/29/2020 06:28 AM    Glucose (POC) 117 (H) 05/28/2020 09:16 PM    Glucose (POC) 91 05/28/2020 04:06 PM    Glucose (POC) 153 (H) 05/28/2020 11:25 AM     No results found for: COLOR, APPRN, SPGRU, REFSG, PAM, PROTU, GLUCU, KETU, BILU, UROU, JANETH, LEUKU, GLUKE, EPSU, BACTU, WBCU, RBCU, CASTS, UCRY      Medications Reviewed:     Current Facility-Administered Medications   Medication Dose Route Frequency    vancomycin (VANCOCIN) 1500 mg in  ml infusion  1,500 mg IntraVENous Q12H    acetaminophen (TYLENOL) tablet 1,000 mg  1,000 mg Oral Q6H PRN    gabapentin (NEURONTIN) capsule 300 mg  300 mg Oral DAILY    amLODIPine (NORVASC) tablet 10 mg  10 mg Oral DAILY    carvediloL (COREG) tablet 12.5 mg  12.5 mg Oral BID WITH MEALS    lisinopriL (PRINIVIL, ZESTRIL) tablet 5 mg  5 mg Oral DAILY    sodium chloride (NS) flush 5-40 mL  5-40 mL IntraVENous Q8H    sodium chloride (NS) flush 5-40 mL  5-40 mL IntraVENous PRN    insulin glargine (LANTUS) injection 25 Units  25 Units SubCUTAneous QHS    insulin lispro (HUMALOG) injection   SubCUTAneous AC&HS    glucose chewable tablet 16 g  4 Tab Oral PRN    glucagon (GLUCAGEN) injection 1 mg  1 mg IntraMUSCular PRN    Vancomycin Pharmacy Dosing   Other PRN    rivaroxaban (XARELTO) tablet 20 mg  20 mg Oral DAILY    dextrose 10 % infusion 125-250 mL  125-250 mL IntraVENous PRN    piperacillin-tazobactam (ZOSYN) 3.375 g in 0.9% sodium chloride (MBP/ADV) 100 mL  3.375 g IntraVENous Q8H    atorvastatin (LIPITOR) tablet 40 mg  40 mg Oral DAILY     ______________________________________________________________________  EXPECTED LENGTH OF STAY: 3d 4h  ACTUAL LENGTH OF STAY:          [id number] [facility]

## 2020-05-29 NOTE — PERIOP NOTES
TRANSFER - OUT REPORT:    Verbal report given to Laine GILLIS(name) on Carolina Nunez  being transferred to [facility](unit) for routine post - op       Report consisted of patients Situation, Background, Assessment and   Recommendations(SBAR). Time Pre op antibiotic given:  Anesthesia Stop time: 4131  Azevedo Present on Transfer to floor:no  Order for Azevedo on Chart:no  Discharge Prescriptions with Chart:no    Information from the following report(s) SBAR, OR Summary, Procedure Summary, Intake/Output, MAR, Recent Results, Med Rec Status and Cardiac Rhythm nsr was reviewed with the receiving nurse. Opportunity for questions and clarification was provided. Is the patient on 02? YES       L/Min 2       Other nc    Is the patient on a monitor? NO    Is the nurse transporting with the patient? YES    Surgical Waiting Area notified of patient's transfer from PACU? YES      The following personal items collected during your admission accompanied patient upon transfer:   Dental Appliance: Dental Appliances: None  Vision: Visual Aid: Glasses, With patient  Hearing Aid:    Jewelry: Jewelry: None  Clothing: Clothing: At bedside  Other Valuables:  Other Valuables: None  Valuables sent to safe:

## 2020-05-29 NOTE — ROUTINE PROCESS
Bedside and Verbal shift change report given to [facility] (oncoming nurse) by Mp Kelly RN (offgoing nurse). Report included the following information SBAR, Kardex, Intake/Output, MAR and Recent Results.

## 2020-05-29 NOTE — CONSULTS
Foot and Ankle Surgery Consult    Subjective:      Niyah Bello is a 71 y.o. male who presents for evaluation of his right foot   He stepped on a nail and has pain along the lateral aspect of the right foot. Patient was admitted thru the ER 5/25/20. Past Medical History:   Diagnosis Date    Diabetes ([address]     Hypertension      Past Surgical History:   Procedure Laterality Date    HX OTHER SURGICAL      cyst removal to back      History reviewed. No pertinent family history.   Social History     Socioeconomic History    Marital status: SINGLE     Spouse name: Not on file    Number of children: Not on file    Years of education: Not on file    Highest education level: Not on file   Tobacco Use    Smoking status: Never Smoker    Smokeless tobacco: Never Used   Substance and Sexual Activity    Alcohol use: No    Drug use: No      Current Facility-Administered Medications   Medication Dose Route Frequency Provider Last Rate Last Dose    vancomycin (VANCOCIN) 1500 mg in  ml infusion  1,500 mg IntraVENous Q12H Sarmad Calvillo  mL/hr at 05/29/20 0635 1,500 mg at 05/29/20 0635    acetaminophen (TYLENOL) tablet 1,000 mg  1,000 mg Oral Q6H PRN Sarmad Calvillo MD   1,000 mg at 05/28/20 2146    gabapentin (NEURONTIN) capsule 300 mg  300 mg Oral DAILY Sarmad Calvillo MD   300 mg at 05/28/20 1047    amLODIPine (NORVASC) tablet 10 mg  10 mg Oral DAILY Sarmad Calvillo MD   10 mg at 05/28/20 1047    carvediloL (COREG) tablet 12.5 mg  12.5 mg Oral BID WITH MEALS Sarmad Calvillo MD   12.5 mg at 05/29/20 0716    lisinopriL (PRINIVIL, ZESTRIL) tablet 5 mg  5 mg Oral DAILY Sarmad Calvillo MD   5 mg at 05/28/20 1047    sodium chloride (NS) flush 5-40 mL  5-40 mL IntraVENous Q8H Sarmad Calvillo MD   10 mL at 05/27/20 0725    sodium chloride (NS) flush 5-40 mL  5-40 mL IntraVENous PRN Sarmad Calvillo MD        insulin glargine (LANTUS) injection 25 Units 25 Units SubCUTAneous QHS Theron Gottron, MD   25 Units at 20 2146    insulin lispro (HUMALOG) injection   SubCUTAneous AC&HS Theron Gottron, MD   Stopped at 20 1630    glucose chewable tablet 16 g  4 Tab Oral PRN Theron Gottron, MD        glucagon (GLUCAGEN) injection 1 mg  1 mg IntraMUSCular PRN Theron Gottron, MD        Vancomycin Pharmacy Dosing   Other PRN Theron Gottron, MD        rivaroxaban (XARELTO) tablet 20 mg  20 mg Oral DAILY Theron Gottron, MD   20 mg at 20 1048    dextrose 10 % infusion 125-250 mL  125-250 mL IntraVENous PRN Theron Gottron, MD        piperacillin-tazobactam (ZOSYN) 3.375 g in 0.9% sodium chloride (MBP/ADV) 100 mL  3.375 g IntraVENous Q8H Theron Gottron, MD 25 mL/hr at 20 0309 3.375 g at 20 0309    atorvastatin (LIPITOR) tablet 40 mg  40 mg Oral DAILY Theron Gottron, MD   40 mg at 20 1048        Allergies   Allergen Reactions    Codeine Other (comments)     \"cause confusion and seeing double\"    OK to try percocet (per dr Wilhelm Situ)       Review of Systems:  A comprehensive review of systems was negative except for that written in the History of Present Illness. Objective:        Patient Vitals for the past 8 hrs:   BP Temp Pulse Resp SpO2   20 0757 166/88 98.3 °F (36.8 °C) 66 16 97 %   20 0703 169/64  67         Temp (24hrs), Av.3 °F (36.8 °C), Min:97.9 °F (36.6 °C), Max:98.6 °F (37 °C)      Physical Exam:  There is a puncture wound plantar lateral aspect of the right foot   Depth is SQ (18mm)  The diameter is 4mm    No erythema  No draionage  No odor minimal edema. Patient denies any pain. Afebrile    MRI right heel shows collection of fluid in the subq plantar right heel that measures [id number]. No evidence of tear in the peroneal longus tendon.     Assessment:     Hospital Problems  Date Reviewed: 2020          Codes Class Noted POA    Open wound of right foot ICD-10-CM: S91.301A  ICD-9-CM: 892.0  5/25/2020 Unknown              Plan:     Puncture wound with abscess right heel. Plan I&D later this afternoon. plan discussed with patient.     Patient to be NPO remainder of this am.  He last at at [address] By: Alfonso Tavarez DPM     May 29, 2020

## 2020-05-29 NOTE — BRIEF OP NOTE
Brief Postoperative Note    Patient: Mary Celeste  YOB: 1950  MRN: [id number]    Date of Procedure: 5/29/2020     Pre-Op Diagnosis: abscess right heel    Post-Op Diagnosis: abscess right heel     Procedure(s):  EXCISION AND DRAINAGE RIGHT HEEL ABCESS/ / REQ.  36    Surgeon(s):  Janene Sarmiento DPM    Surgical Assistant: None    Anesthesia: MAC     Estimated Blood Loss (mL): Minimal    Complications: None    Specimens:   ID Type Source Tests Collected by Time Destination   1 : wound bottom right foot Wound Foot, Right GRAM STAIN, CULTURE, WOUND W Peggi Martinis, CULTURE, ANAEROBIC Janene Sarmiento DPM 5/29/2020 1806 Microbiology        Implants: * No implants in log *    Drains: * No LDAs found *    Findings: abscess with purulence    Electronically Signed by German Everett DPM on 5/29/2020 at 6:20 PM

## 2020-05-29 NOTE — ROUTINE PROCESS
TRANSFER - IN REPORT: 
 
Verbal report received from [place]) on Anthony Hanna  being received from [facility](unit) for ordered procedure Report consisted of patients Situation, Background, Assessment and  
Recommendations(SBAR). Information from the following report(s) SBAR, Kardex, ED Summary, Procedure Summary, Intake/Output, MAR and Recent Results was reviewed with the receiving nurse. Opportunity for questions and clarification was provided. Assessment completed upon patients arrival to unit and care assumed.

## 2020-05-29 NOTE — PROGRESS NOTES
Problem: Mobility Impaired (Adult and Pediatric)  Goal: *Acute Goals and Plan of Care (Insert Text)  Description: FUNCTIONAL STATUS PRIOR TO ADMISSION: Patient was modified independent using a single point cane for functional mobility. Pt owns a rolling walker. HOME SUPPORT PRIOR TO ADMISSION: The patient lived with his son but did not require assist.    Physical Therapy Goals  Initiated 5/28/2020  1. Patient will move from supine to sit and sit to supine  in bed with modified independence within 7 day(s). 2.  Patient will transfer from bed to chair and chair to bed with modified independence using the least restrictive device within 7 day(s). 3.  Patient will perform sit to stand with modified independence within 7 day(s). 4.  Patient will ambulate with modified independence for 75 feet with the least restrictive device within 7 day(s). 5.  Patient will ascend/descend 3 stairs with bilateral handrail(s) with supervision/set-up within 7 day(s). Outcome: Progressing Towards Goal   PHYSICAL THERAPY TREATMENT  Patient: Mary Junior (72 y.o. male)  Date: 5/29/2020  Diagnosis: Open wound of right foot [S91.301A]   <principal problem not specified>  Procedure(s) (LRB):  INCISION AND DRAINAGE RIGHT HEEL ABCESS/ / REQ. 1730 (Right)    Precautions: Fall  Chart, physical therapy assessment, plan of care and goals were reviewed. ASSESSMENT  Patient continues with skilled PT services and is progressing towards goals. Patient reporting improved pain control in R foot, sitting EOB often versus supine in bed. Trialed with RW today and patient did very well, improved steadiness and gait symmetry with device versus his SPC. Minor cues needed for approach to bed to return to sitting but able to recall instructions later in session. Likely could have ambulated further but MD requested to see patient ASAP so further distance aborted.   Patient will benefit from post-op shoe for R foot due to bulky dressing and to provide more stable surface for gait on variety of surfaces once home. Patient reports having RW at home. Current Level of Function Impacting Discharge (mobility/balance): Supervision to SBA transfers, gait with RW x 150'    Other factors to consider for discharge:          PLAN :  Patient continues to benefit from skilled intervention to address the above impairments. Continue treatment per established plan of care. to address goals. Recommendation for discharge: (in order for the patient to meet his/her long term goals)  none    This discharge recommendation:  Has been made in collaboration with the attending provider and/or case management    IF patient discharges home will need the following DME: rolling walker (reports he owns one) and post-op shoe       SUBJECTIVE:   Patient stated How am I going to walk around with this little gown on? Kevan Shoe    OBJECTIVE DATA SUMMARY:   Critical Behavior:  Neurologic State: Alert  Orientation Level: Oriented X4        Functional Mobility Training:  Bed Mobility:     Supine to Sit: Independent  Sit to Supine: Independent  Scooting: Independent        Transfers:  Sit to Stand: Supervision  Stand to Sit: Supervision                             Balance:  Sitting: Intact  Standing: Impaired  Standing - Static: Good  Standing - Dynamic : Fair  Ambulation/Gait Training:  Distance (ft): 150 Feet (ft)  Assistive Device: Gait belt;Walker, rolling  Ambulation - Level of Assistance: Stand-by assistance        Gait Abnormalities: Antalgic;Decreased step clearance        Base of Support: Shift to left  Stance: Right decreased  Speed/Cinthia: Fluctuations  Step Length: Right shortened;Left shortened                    Pain Rating:  none    Activity Tolerance:   Fair  Please refer to the flowsheet for vital signs taken during this treatment.     After treatment patient left in no apparent distress:   Supine in bed, Call bell within reach, and MD present COMMUNICATION/COLLABORATION:   The patients plan of care was discussed with: Registered nurse.      Que Martínez, PT   Time Calculation: 19 mins

## 2020-05-29 NOTE — ANESTHESIA PREPROCEDURE EVALUATION
Relevant Problems   No relevant active problems       Anesthetic History   No history of anesthetic complications            Review of Systems / Medical History  Patient summary reviewed, nursing notes reviewed and pertinent labs reviewed    Pulmonary  Within defined limits                 Neuro/Psych   Within defined limits           Cardiovascular  Within defined limits  Hypertension                   GI/Hepatic/Renal  Within defined limits              Endo/Other  Within defined limits  Diabetes         Other Findings              Physical Exam    Airway  Mallampati: II  TM Distance: > 6 cm  Neck ROM: normal range of motion   Mouth opening: Normal     Cardiovascular  Regular rate and rhythm,  S1 and S2 normal,  no murmur, click, rub, or gallop             Dental  No notable dental hx       Pulmonary  Breath sounds clear to auscultation               Abdominal  GI exam deferred       Other Findings            Anesthetic Plan    ASA: 3  Anesthesia type: MAC            Anesthetic plan and risks discussed with: Patient

## 2020-05-30 ENCOUNTER — APPOINTMENT (OUTPATIENT)
Dept: VASCULAR SURGERY | Age: 70
DRG: 571 | End: 2020-05-30
Attending: PODIATRIST
Payer: MEDICARE

## 2020-05-30 VITALS
HEIGHT: 72 IN | OXYGEN SATURATION: 97 % | TEMPERATURE: 98.3 F | BODY MASS INDEX: 28.71 KG/M2 | DIASTOLIC BLOOD PRESSURE: 79 MMHG | RESPIRATION RATE: 16 BRPM | HEART RATE: 88 BPM | SYSTOLIC BLOOD PRESSURE: 158 MMHG | WEIGHT: 212 LBS

## 2020-05-30 LAB
ANION GAP SERPL CALC-SCNC: 6 MMOL/L (ref 5–15)
BACTERIA SPEC CULT: NORMAL
BASOPHILS # BLD: 0 K/UL (ref 0–0.1)
BASOPHILS NFR BLD: 0 % (ref 0–1)
BUN SERPL-MCNC: 11 MG/DL (ref 6–20)
BUN/CREAT SERPL: 14 (ref 12–20)
CALCIUM SERPL-MCNC: 9.5 MG/DL (ref 8.5–10.1)
CHLORIDE SERPL-SCNC: 108 MMOL/L (ref 97–108)
CO2 SERPL-SCNC: 25 MMOL/L (ref 21–32)
CREAT SERPL-MCNC: 0.77 MG/DL (ref 0.7–1.3)
DIFFERENTIAL METHOD BLD: ABNORMAL
EOSINOPHIL # BLD: 0 K/UL (ref 0–0.4)
EOSINOPHIL NFR BLD: 0 % (ref 0–7)
ERYTHROCYTE [DISTWIDTH] IN BLOOD BY AUTOMATED COUNT: 11.9 % (ref 11.5–14.5)
GLUCOSE BLD STRIP.AUTO-MCNC: 209 MG/DL (ref 65–100)
GLUCOSE SERPL-MCNC: 180 MG/DL (ref 65–100)
HCT VFR BLD AUTO: 36 % (ref 36.6–50.3)
HGB BLD-MCNC: 11.8 G/DL (ref 12.1–17)
IMM GRANULOCYTES # BLD AUTO: 0 K/UL
IMM GRANULOCYTES NFR BLD AUTO: 0 %
LYMPHOCYTES # BLD: 1.4 K/UL (ref 0.8–3.5)
LYMPHOCYTES NFR BLD: 10 % (ref 12–49)
MCH RBC QN AUTO: 28.2 PG (ref 26–34)
MCHC RBC AUTO-ENTMCNC: 32.8 G/DL (ref 30–36.5)
MCV RBC AUTO: 85.9 FL (ref 80–99)
MONOCYTES # BLD: 0.4 K/UL (ref 0–1)
MONOCYTES NFR BLD: 3 % (ref 5–13)
NEUTS SEG # BLD: 12.4 K/UL (ref 1.8–8)
NEUTS SEG NFR BLD: 87 % (ref 32–75)
NRBC # BLD: 0 K/UL (ref 0–0.01)
NRBC BLD-RTO: 0 PER 100 WBC
PLATELET # BLD AUTO: 410 K/UL (ref 150–400)
PMV BLD AUTO: 9.2 FL (ref 8.9–12.9)
POTASSIUM SERPL-SCNC: 4.1 MMOL/L (ref 3.5–5.1)
RBC # BLD AUTO: 4.19 M/UL (ref 4.1–5.7)
RBC MORPH BLD: ABNORMAL
SERVICE CMNT-IMP: ABNORMAL
SERVICE CMNT-IMP: NORMAL
SODIUM SERPL-SCNC: 139 MMOL/L (ref 136–145)
WBC # BLD AUTO: 14.2 K/UL (ref 4.1–11.1)

## 2020-05-30 PROCEDURE — 74011250636 HC RX REV CODE- 250/636: Performed by: HOSPITALIST

## 2020-05-30 PROCEDURE — 74011000258 HC RX REV CODE- 258: Performed by: HOSPITALIST

## 2020-05-30 PROCEDURE — 74011636637 HC RX REV CODE- 636/637: Performed by: HOSPITALIST

## 2020-05-30 PROCEDURE — 80048 BASIC METABOLIC PNL TOTAL CA: CPT

## 2020-05-30 PROCEDURE — 74011250637 HC RX REV CODE- 250/637: Performed by: HOSPITALIST

## 2020-05-30 PROCEDURE — 82962 GLUCOSE BLOOD TEST: CPT

## 2020-05-30 PROCEDURE — 36415 COLL VENOUS BLD VENIPUNCTURE: CPT

## 2020-05-30 PROCEDURE — 85025 COMPLETE CBC W/AUTO DIFF WBC: CPT

## 2020-05-30 RX ORDER — LISINOPRIL 10 MG/1
10 TABLET ORAL DAILY
Qty: 30 TAB | Refills: 0 | Status: SHIPPED | OUTPATIENT
Start: 2020-05-30

## 2020-05-30 RX ADMIN — INSULIN LISPRO 3 UNITS: 100 INJECTION, SOLUTION INTRAVENOUS; SUBCUTANEOUS at 07:06

## 2020-05-30 RX ADMIN — CARVEDILOL 12.5 MG: 12.5 TABLET, FILM COATED ORAL at 07:08

## 2020-05-30 RX ADMIN — VANCOMYCIN HYDROCHLORIDE 1500 MG: 10 INJECTION, POWDER, LYOPHILIZED, FOR SOLUTION INTRAVENOUS at 06:26

## 2020-05-30 RX ADMIN — LISINOPRIL 5 MG: 5 TABLET ORAL at 10:15

## 2020-05-30 RX ADMIN — PIPERACILLIN AND TAZOBACTAM 3.38 G: 3; .375 INJECTION, POWDER, FOR SOLUTION INTRAVENOUS at 03:39

## 2020-05-30 RX ADMIN — RIVAROXABAN 20 MG: 20 TABLET, FILM COATED ORAL at 10:15

## 2020-05-30 RX ADMIN — AMLODIPINE BESYLATE 10 MG: 5 TABLET ORAL at 10:15

## 2020-05-30 RX ADMIN — GABAPENTIN 300 MG: 300 CAPSULE ORAL at 10:15

## 2020-05-30 RX ADMIN — ATORVASTATIN CALCIUM 40 MG: 40 TABLET, FILM COATED ORAL at 10:15

## 2020-05-30 NOTE — ANESTHESIA POSTPROCEDURE EVALUATION
Procedure(s):  INCISION AND DRAINAGE RIGHT HEEL ABCESS/ / REQ. 1730. MAC    <BSHSIANPOST>    INITIAL Post-op Vital signs:   Vitals Value Taken Time   /64 5/29/2020  7:30 PM   Temp 36.7 °C (98.1 °F) 5/29/2020  7:19 PM   Pulse 61 5/29/2020  7:31 PM   Resp 16 5/29/2020  7:31 PM   SpO2 97 % 5/29/2020  7:31 PM   Vitals shown include unvalidated device data.

## 2020-05-30 NOTE — PROGRESS NOTES
1315 patient dressing change done per MD order. patient educated with discharge instructions and rx. Patient verbalized understanding with adequate teach back. Patient signed copy of the discharge instructions that were then placed on the hard chart.

## 2020-05-30 NOTE — DISCHARGE SUMMARY
Discharge Summary       PATIENT ID: Anthony Hanna  MRN: [id number]   YOB: 1950    DATE OF ADMISSION: 5/25/2020  9:21 AM    DATE OF DISCHARGE: 05/30/20  PRIMARY CARE PROVIDER: Jolanta Guerrero MD     ATTENDING PHYSICIAN: Yanet Nazario MD  DISCHARGING PROVIDER: Nicholas Carpenter NP    To contact this individual call [phone] and ask the  to page. If unavailable ask to be transferred the Adult Hospitalist Department. CONSULTATIONS: IP CONSULT TO PODIATRY    PROCEDURES/SURGERIES: Procedure(s):  INCISION AND DRAINAGE RIGHT HEEL ABCESS/ / REQ. [facility] COURSE:   This is a 51-year-old [facility] American male with past medical history significant for type 2 diabetes mellitus, hypertension, peripheral neuropathy, AFib, and cardiomyopathy, who presented to [address] [place] [address] Emergency Department with right foot pain and swelling, 3-4 days' duration. 05/27/20 MRI right foot- 1. Small fluid collection in the subcutaneous tissues of the plantar aspect of  the heel towards the midfoot. Several locules of gas are seen within this. Findings are concerning for a small abscess. No evidence of osteomyelitis. 2. Small osteochondral lesion of the lateral talar dome. 05/29/30- Excision and drainage done to right foot abscess. DISCHARGE DIAGNOSES / PLAN:      Right foot puncture wound and cellulitis with possible abscess.  -failed outpatient abx treatment   -continue iv zosyn and vancomycin  -x ray of foot no acute abnormality   -wound cx no growth so far  - Blood Cultures - NGTD Day # 4  -MRI 5/27 result: 1. Small fluid collection in the subcutaneous tissues of the plantar aspect of  the heel towards the midfoot. Several locules of gas are seen within this. Findings are concerning for a small abscess. No evidence of osteomyelitis.   2. Small osteochondral lesion of the lateral talar dome.  -Follow up with Dr. Afsaneh Galaviz in 5-7 days   - Home health to assist with dressing changes       Type 2DM with hyperglycemia  -A1c 7.1  - Diabetic diet   - Diabetic foot care discussed   - Resume home medication      HTN  - Continue Amlodipine and Coreg   - Lisinopril was decreased to 5mg while in hospital. He was taking 30mg PTA. We will restart at 10mg at discharge. Patient educated to follow up with primary care regarding dose.      Peripheral neuropathy   -stable  -continue home gabapentin      Hypokalemia  - Resolved  -Potassium 4.1     Hx of Paroxysmal A Fib, rate controlled  -continue coreg   - Patient states he has not taken Xarelto in \"a very long time, maybe more than a year\". Spoke with pharmacist and he was unable to find proof that this patient had xarelto filled within the last 6 months.       Hyperlipidemia.  -continue lipitor     Hx of cardiomyopathy.  -compensated, continue lisinopril and coreg  -monitor I/O and daily weight        ADDITIONAL CARE RECOMMENDATIONS:   Home health with assist you with dressing changes     Take medications as prescribed. As a diabetic, it is very important that you take care of your feet. PENDING TEST RESULTS:   At the time of discharge the following test results are still pending: Wound Culture. FOLLOW UP APPOINTMENTS:    Follow-up Information     Follow up With Specialties Details Why Contact Info    Claudene Elm, MD Internal Medicine Schedule an appointment as soon as possible for a visit in 1 week  [address]  [phone]      [facility] at [address]; please contact agency if you have not heard from them by noon tomorrow.   [facility] [address] [facility], [facility] Ferguson, [place] [facility] Schedule an appointment as soon as possible for a visit Please call as soon as possible to make a follow up appointment in 5 to 7 day  [address]  [phone]               DIET: Diabetic Diet      ACTIVITY: Activity as tolerated    WOUND CARE: Per home health     EQUIPMENT needed: None      DISCHARGE MEDICATIONS:  Current Discharge Medication List      START taking these medications    Details   doxycycline (VIBRAMYCIN) 100 mg capsule Take 1 Cap by mouth two (2) times a day. Qty: 20 Cap, Refills: 1         CONTINUE these medications which have CHANGED    Details   lisinopriL (PRINIVIL, ZESTRIL) 10 mg tablet Take 1 Tab by mouth daily. Qty: 30 Tab, Refills: 0         CONTINUE these medications which have NOT CHANGED    Details   labetalol (NORMODYNE) 200 mg tablet Take 200 mg by mouth two (2) times a day. carvedilol (COREG) 12.5 mg tablet Take 12.5 mg by mouth two (2) times daily (with meals). amLODIPine (NORVASC) 10 mg tablet Take  by mouth daily. metFORMIN (GLUCOPHAGE) 500 mg tablet Take 500 mg by mouth two (2) times daily (with meals). acetaminophen (TYLENOL EXTRA STRENGTH) 500 mg tablet Take 1 Tab by mouth every six (6) hours as needed for Pain. Qty: 20 Tab, Refills: 0      cyclobenzaprine (FLEXERIL) 10 mg tablet Take 1 Tab by mouth three (3) times daily as needed for Muscle Spasm(s). Qty: 15 Tab, Refills: 0      gabapentin (NEURONTIN) 300 mg capsule Take 300 mg by mouth daily. atorvastatin (LIPITOR) 40 mg tablet Take 40 mg by mouth daily. STOP taking these medications       rivaroxaban (XARELTO) 20 mg tab tablet Comments:   Reason for Stopping:                 NOTIFY YOUR PHYSICIAN FOR ANY OF THE FOLLOWING:   Fever over 101 degrees for 24 hours. Chest pain, shortness of breath, fever, chills, nausea, vomiting, diarrhea, change in mentation, falling, weakness, bleeding. Severe pain or pain not relieved by medications. Or, any other signs or symptoms that you may have questions about.     DISPOSITION:   X Home With:   OT  PT X HH  RN       Long term SNF/Inpatient Rehab    Independent/assisted living    Hospice    Other:       PATIENT CONDITION AT DISCHARGE:     Functional status    Poor Deconditioned    X Independent      Cognition   X  Lucid     Forgetful     Dementia      Catheters/lines (plus indication)    [place]     PICC     PEG    X None      Code status    X Full code     DNR      PHYSICAL EXAMINATION AT DISCHARGE:  General:          Alert, cooperative, no distress, appears stated age. HEENT:           Atraumatic, anicteric sclerae, pink conjunctivae                          No oral ulcers, mucosa moist, throat clear, dentition fair  Neck:               Supple, symmetrical  Lungs:             Clear to auscultation bilaterally. No Wheezing or Rhonchi. No rales. Chest wall:      No tenderness  No Accessory muscle use. Heart:              Regular  rhythm,  No  murmur   No edema  Abdomen:        Soft, non-tender. Not distended. Bowel sounds normal  Extremities:     No cyanosis. No clubbing,                            Skin turgor normal, Capillary refill normal, Dressing to right foot dry and intact. Skin:                Not pale. Not Jaundiced  No rashes   Psych:             Not anxious or agitated.   Neurologic:      Alert, moves all extremities, answers questions appropriately and responds to commands       CHRONIC MEDICAL DIAGNOSES:  Problem List as of 5/30/2020 Date Reviewed: 5/26/2020          Codes Class Noted - Resolved    * (Principal) Abscess of right foot ICD-10-CM: L02.611  ICD-9-CM: 682.7  5/29/2020 - Present        Open wound of right foot ICD-10-CM: S91.301A  ICD-9-CM: 892.0  5/25/2020 - Present              Greater than 40 minutes were spent with the patient on counseling and coordination of care    Signed:   Cammy Whitehead NP  5/30/2020  12:10 PM

## 2020-05-30 NOTE — OP NOTES
[address]  OPERATIVE REPORT    Name:  Shashank Maldonado  MR#:  [id number]  :  1950  ACCOUNT #:  [de-identified]  DATE OF SERVICE:  2020      PREOPERATIVE DIAGNOSIS:  Abscess, plantar right heel. POSTOPERATIVE DIAGNOSIS:  Abscess, plantar right heel. PROCEDURE PERFORMED:  Excision and debridement of abscess, right heel. SURGEON:  Oscar Drew DPM    ASSISTANT: none    ANESTHESIA:  IV sedation with local.    COMPLICATIONS:  none    SPECIMENS REMOVED:  Wound cultures. IMPLANTS:  none    ESTIMATED BLOOD LOSS:  Minimal.    INDICATIONS:  This is a 71-year-old black male who had presented through the ER 3 days ago with a painful right heel. The patient states that he thinks he had stepped on a nail. History and physical was consistent with admitting history and physical, otherwise history and physical was performed on the floor and the patient released for surgery. Physical examination of the lower extremities revealed palpable pedal pulses, fairly good muscle strength lower extremities bilateral with diminished epicritic sensation. Plantar aspect of the right heel has a puncture wound that measures 0.3 x 0.3 x 1 cm deep, bone is not probed. There is minimal warmth, minimal swelling; however, the patient does state he has some pain. An MRI taken indicated an abscess of superficial fascia plantar right heel that measured 10 x 18 x 19 mm. DESCRIPTION OF PROCEDURE:  The patient was brought into the operating room and placed on the operating table in the supine position. Under the influence of IV sedation, anesthesia was achieved to the right foot using 0.05% Marcaine with epinephrine. A successful time-out was completed. Next, using Metzenbaum scissors, an excision and debridement were performed of the abscess. Rongeurs were also used.   Necrotic tissue was removed as well as subcutaneous tissue, fascial tissue and there was some lifted epithelium extending medially with underlying purulence. All of this was also removed. The wound was now flushed using pulsed lavage. Prior to flushing the wound, aerobic as well as anaerobic wound cultures were taken. It appears that all purulence and all necrotic tissue were removed. Post debridement measurements were 0.5 x 3 x 3 cm. Wound was now loosely packed with Iodoform gauze followed by Xeroform, 4 x 4's, Kerlix, and an outer Ace. The patient appeared to have tolerated anesthesia and procedure well and was transferred from the operating room to the recovery with all vital signs stable and vascular status intact. I do not feel that there was no evidence of osteomyelitis indicated on the MRI nor was the bone probed. I feel that from a Podiatry standpoint, this patient can be released pending Internal Medicine clearance. I have also prescribed doxycycline for the patient to take home antibiotics; however, the antibiotic therapy will be adjusted if needed pending wound culture results.       SAYRA [email or web address]  D:  05/29/2020 18:37  T:  05/30/2020 1:15  JOB #:  [id number]

## 2020-05-30 NOTE — DISCHARGE INSTRUCTIONS
Discharge Instructions       PATIENT ID: Adele Pedersen  MRN: [id number]   YOB: 1950    DATE OF ADMISSION: 5/25/2020  9:21 AM    DATE OF DISCHARGE: 5/30/2020    PRIMARY CARE PROVIDER: Noemí Oviedo MD     ATTENDING PHYSICIAN: Waldo Andre MD  DISCHARGING PROVIDER: Brandie Hernández NP    To contact this individual call [phone] and ask the  to page. If unavailable ask to be transferred the Adult Hospitalist Department. DISCHARGE DIAGNOSES Abscess, plantar right heel s/p excision and drainage    CONSULTATIONS: IP CONSULT TO PODIATRY    PROCEDURES/SURGERIES: Procedure(s):  INCISION AND DRAINAGE RIGHT HEEL ABCESS/ / REQ. 1730    PENDING TEST RESULTS:   At the time of discharge the following test results are still pending: Wound cultures. FOLLOW UP APPOINTMENTS:   Follow-up Information     Follow up With Specialties Details Why Contact Info    Noemí Oviedo MD Internal Medicine Schedule an appointment as soon as possible for a visit in 1 week  [address] [place] [address]  [phone]      [place] at [address] [place] [address]; please contact agency if you have not heard from them by noon tomorrow. [facility] [facility], [address]    [place], [place] [facility] Schedule an appointment as soon as possible for a visit Please call as soon as possible to make a follow up appointment in 5 to 7 day  [address] [place] [address]  [facility]  [phone]             ADDITIONAL CARE RECOMMENDATIONS:   Home health with assist you with dressing changes     Take medications as prescribed. As a diabetic, it is very important that you take care of your feet.      DIET: Diabetic Diet      ACTIVITY: Activity as tolerated    WOUND CARE: Per home health instructions    EQUIPMENT needed: None      DISCHARGE MEDICATIONS:   See Medication Reconciliation Form    · It is important that you take the medication exactly as they are prescribed. · Keep your medication in the bottles provided by the pharmacist and keep a list of the medication names, dosages, and times to be taken in your wallet. · Do not take other medications without consulting your doctor. NOTIFY YOUR PHYSICIAN FOR ANY OF THE FOLLOWING:   Fever over 101 degrees for 24 hours. Chest pain, shortness of breath, fever, chills, nausea, vomiting, diarrhea, change in mentation, falling, weakness, bleeding. Severe pain or pain not relieved by medications. Or, any other signs or symptoms that you may have questions about.       DISPOSITION:   X Home With:   OT  PT  HH  RN       SNF/Inpatient Rehab/LTAC    Independent/assisted living    Hospice    Other:         Signed:   Estrellita Girard NP  5/30/2020  11:58 AM

## 2020-05-30 NOTE — PROGRESS NOTES
CM contacted by NP regarding disposition and consult for [facility] for dressing changes. CM sent referral to [facility]- pt agreeable; agency accepted. CM contacted agency to notify that pt reporting he will be at daughters home at discharge; CM notified agency and requested agency to follow-up with daughter regarding address and assistance on teaching, liaison agreeable. Agency to reach out to pt and daughter this evening to schedule and [facility] tomorrow. NP notified. AVS updated. RN notified.      Mague Real RN, BSN  Care Management Department

## 2020-05-30 NOTE — PROGRESS NOTES
Foot and Ankle Surgery Progress Note    Patient: Petrona Bravo MRN: [id number]  SSN: [id number]    YOB: 1950  Age: 71 y.o. Sex: male      Admit Date: 2020    1 Day Post-Op    Procedure:  Procedure(s):  INCISION AND DRAINAGE RIGHT HEEL ABCESS/ / REQ. 1730    Subjective:     Patient has no new complaints. Objective:     Visit Vitals  /81 (BP 1 Location: Right arm, BP Patient Position: At rest)   Pulse 80   Temp 98.3 °F (36.8 °C)   Resp 16   Ht 6' (1.829 m)   Wt 96.2 kg (212 lb)   SpO2 97%   BMI 28.75 kg/m²       Temp (24hrs), Av.4 °F (36.9 °C), Min:97.9 °F (36.6 °C), Max:98.9 °F (37.2 °C)      Physical Exam:    Dressing is clean and intact   Good color and temperature of the toes   And they have good active ROM      Data Review: images and reports reviewed    Lab Review: All lab results for the last 24 hours reviewed.     Assessment:     Hospital Problems  Date Reviewed: 2020          Codes Class Noted POA    * (Principal) Abscess of right foot ICD-10-CM: L02.611  ICD-9-CM: 682.7  2020 Yes        Open wound of right foot ICD-10-CM: O10.488C  ICD-9-CM: 892.0  2020 Unknown              Plan/Recommendations/Medical Decision Making:     Continue present treatment    Signed By: Blaine Baker DPM     May 30, 2020

## 2020-05-31 LAB
BACTERIA SPEC CULT: NORMAL
BACTERIA SPEC CULT: NORMAL
GRAM STN SPEC: NORMAL
GRAM STN SPEC: NORMAL
SERVICE CMNT-IMP: NORMAL
SERVICE CMNT-IMP: NORMAL

## 2021-09-09 ENCOUNTER — TRANSCRIBE ORDER (OUTPATIENT)
Dept: SCHEDULING | Age: 71
End: 2021-09-09

## 2021-09-09 DIAGNOSIS — G45.9 TIA (TRANSIENT ISCHEMIC ATTACK): Primary | ICD-10-CM

## 2022-03-18 PROBLEM — S91.301A OPEN WOUND OF RIGHT FOOT: Status: ACTIVE | Noted: 2020-05-25

## 2022-03-18 NOTE — ED TRIAGE NOTES
Pt stated he stepped on a nail 2 days ago and was placed on antibiotics, now having increased pain and swelling, denies fever , denies drainage, +swelling noted, +warm to touch
no

## 2022-03-19 PROBLEM — L02.611 ABSCESS OF RIGHT FOOT: Status: ACTIVE | Noted: 2020-05-29

## 2022-12-12 ENCOUNTER — TRANSCRIBE ORDER (OUTPATIENT)
Dept: SCHEDULING | Age: 72
End: 2022-12-12

## 2022-12-12 DIAGNOSIS — M79.672 BILATERAL LEG AND FOOT PAIN: Primary | ICD-10-CM

## 2022-12-12 DIAGNOSIS — M79.605 BILATERAL LEG AND FOOT PAIN: Primary | ICD-10-CM

## 2022-12-12 DIAGNOSIS — M79.671 BILATERAL LEG AND FOOT PAIN: Primary | ICD-10-CM

## 2022-12-12 DIAGNOSIS — M79.604 BILATERAL LEG AND FOOT PAIN: Primary | ICD-10-CM

## 2022-12-13 ENCOUNTER — HOSPITAL ENCOUNTER (EMERGENCY)
Age: 72
Discharge: HOME | End: 2022-12-14
Attending: STUDENT IN AN ORGANIZED HEALTH CARE EDUCATION/TRAINING PROGRAM
Payer: MEDICARE

## 2022-12-13 DIAGNOSIS — R73.9 HYPERGLYCEMIA: ICD-10-CM

## 2022-12-13 DIAGNOSIS — R00.2 PALPITATIONS: Primary | ICD-10-CM

## 2022-12-13 LAB
ALBUMIN SERPL-MCNC: 4.2 G/DL (ref 3.5–5)
ALBUMIN/GLOB SERPL: 1.1 {RATIO} (ref 1.1–2.2)
ALP SERPL-CCNC: 203 U/L (ref 45–117)
ALT SERPL-CCNC: 82 U/L (ref 12–78)
ANION GAP SERPL CALC-SCNC: 4 MMOL/L (ref 5–15)
AST SERPL-CCNC: 27 U/L (ref 15–37)
BASOPHILS # BLD: 0.2 K/UL (ref 0–0.1)
BASOPHILS NFR BLD: 2 % (ref 0–1)
BILIRUB SERPL-MCNC: 0.7 MG/DL (ref 0.2–1)
BUN SERPL-MCNC: 12 MG/DL (ref 6–20)
BUN/CREAT SERPL: 12 (ref 12–20)
CALCIUM SERPL-MCNC: 9.2 MG/DL (ref 8.5–10.1)
CHLORIDE SERPL-SCNC: 100 MMOL/L (ref 97–108)
CO2 SERPL-SCNC: 33 MMOL/L (ref 21–32)
COMMENT, HOLDF: NORMAL
CREAT SERPL-MCNC: 0.99 MG/DL (ref 0.7–1.3)
DIFFERENTIAL METHOD BLD: ABNORMAL
EOSINOPHIL # BLD: 0.2 K/UL (ref 0–0.4)
EOSINOPHIL NFR BLD: 2 % (ref 0–7)
ERYTHROCYTE [DISTWIDTH] IN BLOOD BY AUTOMATED COUNT: 11.5 % (ref 11.5–14.5)
GLOBULIN SER CALC-MCNC: 3.7 G/DL (ref 2–4)
GLUCOSE SERPL-MCNC: 410 MG/DL (ref 65–100)
HCT VFR BLD AUTO: 47.9 % (ref 36.6–50.3)
HGB BLD-MCNC: 16 G/DL (ref 12.1–17)
IMM GRANULOCYTES # BLD AUTO: 0 K/UL
IMM GRANULOCYTES NFR BLD AUTO: 0 %
LYMPHOCYTES # BLD: 3.7 K/UL (ref 0.8–3.5)
LYMPHOCYTES NFR BLD: 40 % (ref 12–49)
MCH RBC QN AUTO: 29.1 PG (ref 26–34)
MCHC RBC AUTO-ENTMCNC: 33.4 G/DL (ref 30–36.5)
MCV RBC AUTO: 87.1 FL (ref 80–99)
MONOCYTES # BLD: 0.8 K/UL (ref 0–1)
MONOCYTES NFR BLD: 9 % (ref 5–13)
NEUTS SEG # BLD: 4.3 K/UL (ref 1.8–8)
NEUTS SEG NFR BLD: 47 % (ref 32–75)
NRBC # BLD: 0 K/UL (ref 0–0.01)
NRBC BLD-RTO: 0 PER 100 WBC
PLATELET # BLD AUTO: 334 K/UL (ref 150–400)
PMV BLD AUTO: 9.7 FL (ref 8.9–12.9)
POTASSIUM SERPL-SCNC: 3.5 MMOL/L (ref 3.5–5.1)
PROT SERPL-MCNC: 7.9 G/DL (ref 6.4–8.2)
RBC # BLD AUTO: 5.5 M/UL (ref 4.1–5.7)
RBC MORPH BLD: ABNORMAL
SAMPLES BEING HELD,HOLD: NORMAL
SODIUM SERPL-SCNC: 137 MMOL/L (ref 136–145)
WBC # BLD AUTO: 9.2 K/UL (ref 4.1–11.1)

## 2022-12-13 PROCEDURE — 85025 COMPLETE CBC W/AUTO DIFF WBC: CPT

## 2022-12-13 PROCEDURE — 74011250636 HC RX REV CODE- 250/636: Performed by: STUDENT IN AN ORGANIZED HEALTH CARE EDUCATION/TRAINING PROGRAM

## 2022-12-13 PROCEDURE — 84484 ASSAY OF TROPONIN QUANT: CPT

## 2022-12-13 PROCEDURE — 93005 ELECTROCARDIOGRAM TRACING: CPT

## 2022-12-13 PROCEDURE — 36415 COLL VENOUS BLD VENIPUNCTURE: CPT

## 2022-12-13 PROCEDURE — 96360 HYDRATION IV INFUSION INIT: CPT

## 2022-12-13 PROCEDURE — 99285 EMERGENCY DEPT VISIT HI MDM: CPT

## 2022-12-13 PROCEDURE — 80053 COMPREHEN METABOLIC PANEL: CPT

## 2022-12-13 RX ADMIN — SODIUM CHLORIDE 1000 ML: 9 INJECTION, SOLUTION INTRAVENOUS at 23:21

## 2022-12-14 ENCOUNTER — APPOINTMENT (OUTPATIENT)
Dept: GENERAL RADIOLOGY | Age: 72
End: 2022-12-14
Attending: STUDENT IN AN ORGANIZED HEALTH CARE EDUCATION/TRAINING PROGRAM
Payer: MEDICARE

## 2022-12-14 VITALS
DIASTOLIC BLOOD PRESSURE: 92 MMHG | SYSTOLIC BLOOD PRESSURE: 160 MMHG | OXYGEN SATURATION: 97 % | TEMPERATURE: 97.8 F | BODY MASS INDEX: 27.09 KG/M2 | HEIGHT: 72 IN | HEART RATE: 87 BPM | RESPIRATION RATE: 21 BRPM | WEIGHT: 200 LBS

## 2022-12-14 LAB
ATRIAL RATE: 110 BPM
CALCULATED P AXIS, ECG09: 61 DEGREES
CALCULATED R AXIS, ECG10: 82 DEGREES
CALCULATED T AXIS, ECG11: 38 DEGREES
DIAGNOSIS, 93000: NORMAL
P-R INTERVAL, ECG05: 148 MS
Q-T INTERVAL, ECG07: 338 MS
QRS DURATION, ECG06: 88 MS
QTC CALCULATION (BEZET), ECG08: 457 MS
TROPONIN-HIGH SENSITIVITY: 20 NG/L (ref 0–76)
TROPONIN-HIGH SENSITIVITY: 24 NG/L (ref 0–76)
VENTRICULAR RATE, ECG03: 110 BPM

## 2022-12-14 PROCEDURE — 84484 ASSAY OF TROPONIN QUANT: CPT

## 2022-12-14 PROCEDURE — 36415 COLL VENOUS BLD VENIPUNCTURE: CPT

## 2022-12-14 PROCEDURE — 74011250637 HC RX REV CODE- 250/637: Performed by: STUDENT IN AN ORGANIZED HEALTH CARE EDUCATION/TRAINING PROGRAM

## 2022-12-14 PROCEDURE — 71046 X-RAY EXAM CHEST 2 VIEWS: CPT

## 2022-12-14 RX ORDER — MAG HYDROX/ALUMINUM HYD/SIMETH 200-200-20
30 SUSPENSION, ORAL (FINAL DOSE FORM) ORAL
Status: COMPLETED | OUTPATIENT
Start: 2022-12-14 | End: 2022-12-14

## 2022-12-14 RX ADMIN — ALUMINUM HYDROXIDE, MAGNESIUM HYDROXIDE, AND SIMETHICONE 30 ML: 200; 200; 20 SUSPENSION ORAL at 00:46

## 2022-12-14 NOTE — ED PROVIDER NOTES
Patient is a 27-year-old male presenting to the ED with concern for intermittent palpitations and strange sensation in his upper chest and lower neck. Denies heart history but does have a history of hypertension and diabetes. Patient's symptoms not present on evaluation in the ED. The history is provided by the patient and medical records. Palpitations   This is a recurrent problem. The current episode started more than 2 days ago. The problem has been resolved. The problem occurs daily. The problem is associated with nothing. Associated symptoms include malaise/fatigue, chest pain, chest pressure and irregular heartbeat. Pertinent negatives include no diaphoresis, no exertional chest pressure, no near-syncope, no syncope, no nausea and no headaches. Risk factors include hypertension, male gender and diabetes mellitus. He has tried nothing for the symptoms. The treatment provided no relief. His past medical history is significant for DM and hypertension. Past Medical History:   Diagnosis Date    Diabetes ([facility]     Hypertension        Past Surgical History:   Procedure Laterality Date    HX OTHER SURGICAL      cyst removal to back         No family history on file.     Social History     Socioeconomic History    Marital status: SINGLE     Spouse name: Not on file    Number of children: Not on file    Years of education: Not on file    Highest education level: Not on file   Occupational History    Not on file   Tobacco Use    Smoking status: Never    Smokeless tobacco: Never   Substance and Sexual Activity    Alcohol use: No    Drug use: No    Sexual activity: Not on file   Other Topics Concern    Not on file   Social History Narrative    Not on file     Social Determinants of Health     Financial Resource Strain: Not on file   Food Insecurity: Not on file   Transportation Needs: Not on file   Physical Activity: Not on file   Stress: Not on file   Social Connections: Not on file   Intimate Partner Violence: Not on file   Housing Stability: Not on file         ALLERGIES: Codeine    Review of Systems   Constitutional:  Positive for activity change, appetite change and malaise/fatigue. Negative for diaphoresis. HENT: Negative. Eyes: Negative. Respiratory: Negative. Cardiovascular:  Positive for chest pain and palpitations. Negative for syncope and near-syncope. Gastrointestinal: Negative. Negative for nausea. Endocrine: Negative. Genitourinary: Negative. Musculoskeletal: Negative. Skin: Negative. Allergic/Immunologic: Negative. Neurological: Negative. Negative for headaches. Hematological: Negative. Psychiatric/Behavioral: Negative. Vitals:    12/13/22 2157 12/13/22 2320 12/13/22 2321 12/14/22 0301   BP: (!) 205/97  (!) 162/87 (!) 160/92   Pulse: (!) 120  86 87   Resp: 18  18 21   Temp: 97.9 °F (36.6 °C)   97.8 °F (36.6 °C)   SpO2: 95% 97% 97% 97%   Weight: 90.7 kg (200 lb)      Height: 6' (1.829 m)               Physical Exam  Vitals and nursing note reviewed. Constitutional:       General: He is not in acute distress. Appearance: Normal appearance. HENT:      Head: Normocephalic and atraumatic. Right Ear: External ear normal.      Left Ear: External ear normal.      Nose: Nose normal.   Eyes:      Extraocular Movements: Extraocular movements intact. Conjunctiva/sclera: Conjunctivae normal.   Cardiovascular:      Rate and Rhythm: Normal rate and regular rhythm. Pulses: Normal pulses. Radial pulses are 2+ on the right side and 2+ on the left side. Heart sounds: Normal heart sounds. Pulmonary:      Effort: Pulmonary effort is normal.      Breath sounds: Normal breath sounds. Chest:      Chest wall: No deformity or tenderness. Abdominal:      General: Abdomen is flat. There is no distension. Tenderness: There is no abdominal tenderness. Musculoskeletal:         General: No deformity or signs of injury. Normal range of motion. Cervical back: Normal range of motion and neck supple. No tenderness. Skin:     General: Skin is warm and dry. Capillary Refill: Capillary refill takes less than 2 seconds. Neurological:      General: No focal deficit present. Mental Status: He is alert and oriented to person, place, and time. Psychiatric:         Attention and Perception: Attention normal.         Mood and Affect: Mood normal.         Behavior: Behavior normal.        MDM     Amount and/or Complexity of Data Reviewed  Decide to obtain previous medical records or to obtain history from someone other than the patient: yes      ED Course as of 12/15/22 1319   Tue Dec 13, 2022   2211 EKG, 12 LEAD, INITIAL  ED EKG interpretation:  Rhythm: sinus tachycardia; and regular . Rate (approx.): 110; Axis: normal; P wave: normal; QRS interval: normal ; ST/T wave: non-specific changes; Other findings: abnormal ekg. This EKG was interpreted by Daxa Olivera MD,ED Provider.  [CH]   7400 EKG interpretation:   Rhythm: sinus tachycardia; and regular . Rate (approx.): 110; Axis: normal; Intervals: normal ; ST/T wave: Nonspecific ST and T wave changes; EKG documented and interpreted by Jessica Rubin. Mayi Del Valle MD, Emergency Medicine.     [AL]   2333 Pulse (Heart Rate): 86 [AL]      ED Course User Index  [AL] Ximena Vallejo MD  [CH] Anjana Talbot MD     LABORATORY RESULTS:  Labs Reviewed   CBC WITH AUTOMATED DIFF - Abnormal; Notable for the following components:       Result Value    BASOPHILS 2 (*)     ABS. LYMPHOCYTES 3.7 (*)     ABS. BASOPHILS 0.2 (*)     All other components within normal limits   METABOLIC PANEL, COMPREHENSIVE - Abnormal; Notable for the following components:    CO2 33 (*)     Anion gap 4 (*)     Glucose 410 (*)     ALT (SGPT) 82 (*)     Alk.  phosphatase 203 (*)     All other components within normal limits   SAMPLES BEING HELD   TROPONIN-HIGH SENSITIVITY   TROPONIN-HIGH SENSITIVITY       IMAGING RESULTS:  XR CHEST PA LAT Final Result      No acute process. MEDICATIONS GIVEN:  Medications   sodium chloride 0.9 % bolus infusion 1,000 mL (0 mL IntraVENous IV Completed 12/14/22 0046)   alum-mag hydroxide-simeth (MYLANTA) oral suspension 30 mL (30 mL Oral Given 12/14/22 0046)       Differential diagnosis: ACS, GERD, chest pain, esophagitis, DKA, hypertension, hyperglycemia    ED physician interpretation of imaging: Chest x-ray without focal pneumonia or pneumothorax  ED physician interpretation of EKG: No STEMI. See my interpretation EKG in ED course above. ED physician interpretation of laboratory results: Troponins x2 within normal limits, without concerning delta. Hyperglycemia at 410 without signs of DKA. MDM: Patient is a 58-year-old male presented ED with upper chest and throat fullness and palpitations. Patient denies any cardiac type chest pain or shortness of breath. Based on symptoms suspect GERD. ACS work-up unremarkable. Patient does have hyperglycemia and only takes his metformin intermittently. Patient encouraged to take his metformin daily and follow-up closely with his PCP for further management of diabetes. Patient also instructed to follow-up with cardiology. Patient's symptoms resolved and patient stable for discharge home at this time. Further personalized recommendations for outpatient care as below. Key discharge instructions and summary of care: You presented to the ED with heart palpitations while some fullness in her upper chest.  Work-up here in the ED with EKG, troponins and chest x-ray was unremarkable. I do believe you are having a heart attack or heart issue at this time. He may have had a arrhythmia or abnormal heart beat at some time but has not been reproduced or seen on cardiac monitor here in the ED. Please follow-up with cardiology with information in your discharge paperwork. Your blood glucose was elevated at 400 today. IV fluids given.   No signs of DKA or severe effects of hyperglycemia at this time. Please take your metformin twice a day 500 mg as prescribed and follow-up with your PCP for further outpatient management diabetes. The patient has been re-evaluated and feeling better. Patient is stable for discharge. All available radiology and laboratory results have been reviewed with patient and/or available family. Patient and/or family verbally conveyed their understanding and agreement of the patient's signs, symptoms, diagnosis, treatment and prognosis and additionally agree to follow-up as recommended in the discharge instructions or to return to the Emergency Department should their condition change or worsen prior to their follow-up appointment. All questions have been answered and patient and/or available family express understanding. IMPRESSION:  1. Palpitations    2. Hyperglycemia        DISPOSITION: Discharged     Alexx Dawson MD      Procedures

## 2022-12-14 NOTE — ED TRIAGE NOTES
Patient arrives to ED complaining of \"feeling a heart beat in my throat\" for 3-4 days, also reports bloating.  Denies N/V

## 2023-03-02 NOTE — DISCHARGE INSTRUCTIONS
You presented to the ED with heart palpitations while some fullness in her upper chest.  Work-up here in the ED with EKG, troponins and chest x-ray was unremarkable. I do believe you are having a heart attack or heart issue at this time. He may have had a arrhythmia or abnormal heart beat at some time but has not been reproduced or seen on cardiac monitor here in the ED. Please follow-up with cardiology with information in your discharge paperwork. Your blood glucose was elevated at 400 today. IV fluids given. No signs of DKA or severe effects of hyperglycemia at this time. Please take your metformin twice a day 500 mg as prescribed and follow-up with your PCP for further outpatient management diabetes. 70 year old male with history of DM2, CAD s/p CABG, CKD, chronic back pain, and HTN presenting with worsening back pain, found to have spondylolisthesis of lumbar spine. Patient is now s/p L4-L5 TLIF on 2/28/23.

## 2023-08-20 ENCOUNTER — HOSPITAL ENCOUNTER (EMERGENCY)
Facility: HOSPITAL | Age: 73
Discharge: HOME | End: 2023-08-20
Attending: EMERGENCY MEDICINE
Payer: MEDICARE

## 2023-08-20 ENCOUNTER — APPOINTMENT (OUTPATIENT)
Facility: HOSPITAL | Age: 73
End: 2023-08-20
Payer: MEDICARE

## 2023-08-20 VITALS
BODY MASS INDEX: 23.95 KG/M2 | WEIGHT: 176.59 LBS | TEMPERATURE: 98.1 F | DIASTOLIC BLOOD PRESSURE: 100 MMHG | RESPIRATION RATE: 17 BRPM | SYSTOLIC BLOOD PRESSURE: 193 MMHG | HEART RATE: 82 BPM | OXYGEN SATURATION: 99 %

## 2023-08-20 DIAGNOSIS — M79.89 LEG SWELLING: Primary | ICD-10-CM

## 2023-08-20 DIAGNOSIS — M79.661 RIGHT CALF PAIN: ICD-10-CM

## 2023-08-20 PROCEDURE — 6370000000 HC RX 637 (ALT 250 FOR IP): Performed by: EMERGENCY MEDICINE

## 2023-08-20 PROCEDURE — 93970 EXTREMITY STUDY: CPT

## 2023-08-20 PROCEDURE — 99284 EMERGENCY DEPT VISIT MOD MDM: CPT

## 2023-08-20 RX ORDER — OXYCODONE HYDROCHLORIDE AND ACETAMINOPHEN 5; 325 MG/1; MG/1
1 TABLET ORAL EVERY 6 HOURS PRN
Qty: 12 TABLET | Refills: 0 | Status: SHIPPED | OUTPATIENT
Start: 2023-08-20 | End: 2023-08-23

## 2023-08-20 RX ORDER — METHOCARBAMOL 750 MG/1
750 TABLET, FILM COATED ORAL 4 TIMES DAILY PRN
Qty: 20 TABLET | Refills: 0 | Status: SHIPPED | OUTPATIENT
Start: 2023-08-20 | End: 2023-08-30

## 2023-08-20 RX ORDER — OXYCODONE HYDROCHLORIDE AND ACETAMINOPHEN 5; 325 MG/1; MG/1
1 TABLET ORAL
Status: COMPLETED | OUTPATIENT
Start: 2023-08-20 | End: 2023-08-20

## 2023-08-20 RX ORDER — ONDANSETRON 4 MG/1
4 TABLET, ORALLY DISINTEGRATING ORAL ONCE
Status: COMPLETED | OUTPATIENT
Start: 2023-08-20 | End: 2023-08-20

## 2023-08-20 RX ADMIN — ONDANSETRON 4 MG: 4 TABLET, ORALLY DISINTEGRATING ORAL at 15:32

## 2023-08-20 RX ADMIN — OXYCODONE AND ACETAMINOPHEN 1 TABLET: 5; 325 TABLET ORAL at 15:32

## 2023-08-20 NOTE — ED PROVIDER NOTES
[facility] EMERGENCY DEPT  EMERGENCY DEPARTMENT ENCOUNTER       Pt Name: Maximino Villar  MRN: [id number]  [address] [facility] 1950  Date of evaluation: 8/20/2023  Provider: Adelina Mei MD   PCP: Ángel Alvarez MD  Note Started: 4:15 PM EDT 8/20/23     CHIEF COMPLAINT       Chief Complaint   Patient presents with    Leg Swelling     Patient ambulatory into triage c/o R calf swelling/pain and L foot swelling/pain. Patient reports he has been dealing with pain for several weeks, but has gotten worse. Denies hx of blood clots. HISTORY OF PRESENT ILLNESS: 1 or more elements      History From: Patient and daughter, History limited by: none     Maximino Villar is a 68 y.o. male patient with a history of diabetes and hypertension, here for right calf pain and left foot pain. Patient symptoms started weeks ago. The pain in the right calf is a 10 out of 10. Patient denies trauma, fever or chills. The left foot pain is mild in severity. Patient's daughter states he was diagnosed with neuropathy, but she also states that he has had a stent placed for his leg pain. He has a follow-up appointment with that provider this next week. Patient denies chest pain, cough, shortness of breath, recent immobilization or long distance travel. He has not tried anything for his pain. He also has swelling involving the right calf and the left foot. Please See MDM for Additional Details of the HPI/PMH  Nursing Notes were all reviewed and agreed with or any disagreements were addressed in the HPI. REVIEW OF SYSTEMS        Positives and Pertinent negatives as per HPI. PAST HISTORY     Past Medical History:  Past Medical History:   Diagnosis Date    Diabetes ([address])     Hypertension        Past Surgical History:  Past Surgical History:   Procedure Laterality Date    OTHER SURGICAL HISTORY      cyst removal to back       Family History:  No family history on file.     Social History:  Social History     Tobacco Use

## 2023-08-20 NOTE — ED NOTES
DC papers reviewed and in hand, pt verbalized understanding. Patient ambulatory out of ED with steady gait, no acute distress noted.           Laura Kramer RN  08/20/23 9642

## 2024-05-21 ENCOUNTER — HOSPITAL ENCOUNTER (EMERGENCY)
Facility: HOSPITAL | Age: 74
Discharge: HOME | End: 2024-05-21

## 2024-05-21 VITALS
DIASTOLIC BLOOD PRESSURE: 92 MMHG | WEIGHT: 178.13 LBS | BODY MASS INDEX: 23.61 KG/M2 | HEIGHT: 73 IN | SYSTOLIC BLOOD PRESSURE: 183 MMHG | OXYGEN SATURATION: 100 % | HEART RATE: 67 BPM | RESPIRATION RATE: 18 BRPM

## 2024-05-21 ASSESSMENT — PAIN SCALES - GENERAL: PAINLEVEL_OUTOF10: 7

## 2024-05-21 ASSESSMENT — PAIN - FUNCTIONAL ASSESSMENT: PAIN_FUNCTIONAL_ASSESSMENT: 0-10

## 2025-04-24 ENCOUNTER — HOSPITAL ENCOUNTER (OUTPATIENT)
Facility: HOSPITAL | Age: 75
Discharge: HOME | End: 2025-04-26
Attending: PSYCHIATRY & NEUROLOGY
Payer: MEDICARE

## 2025-04-24 ENCOUNTER — TRANSCRIBE ORDERS (OUTPATIENT)
Facility: HOSPITAL | Age: 75
End: 2025-04-24

## 2025-04-24 DIAGNOSIS — M79.604 PAIN OF RIGHT LOWER EXTREMITY: Primary | ICD-10-CM

## 2025-04-24 DIAGNOSIS — M79.604 PAIN OF RIGHT LOWER EXTREMITY: ICD-10-CM

## 2025-04-24 PROCEDURE — 93971 EXTREMITY STUDY: CPT

## (undated) DEVICE — BANDAGE,GAUZE,CONFORMING,4"X75",STRL,LF: Brand: MEDLINE

## (undated) DEVICE — BANDAGE COBAN 6 IN WND 6INX5YD FOAM

## (undated) DEVICE — STERILE POLYISOPRENE POWDER-FREE SURGICAL GLOVES: Brand: PROTEXIS

## (undated) DEVICE — GAUZE,SPONGE,FLUFF,6"X6.75",STRL,5/TRAY: Brand: MEDLINE

## (undated) DEVICE — STOCKINETTE,DOUBLE PLY,6X54,STERILE: Brand: MEDLINE

## (undated) DEVICE — INFECTION CONTROL KIT SYS

## (undated) DEVICE — REM POLYHESIVE ADULT PATIENT RETURN ELECTRODE: Brand: VALLEYLAB

## (undated) DEVICE — HANDPIECE SET WITH BONE CLEANING TIP AND SUCTION TUBE: Brand: INTERPULSE

## (undated) DEVICE — SPONGE GZ W4XL4IN COT 12 PLY TYP VII WVN C FLD DSGN

## (undated) DEVICE — DRAPE,EXTREMITY,89X128,STERILE: Brand: MEDLINE

## (undated) DEVICE — SUTURE VCRL SZ 2-0 L27IN ABSRB UD L26MM SH 1/2 CIR J417H

## (undated) DEVICE — SOLUTION IRRIG 3000ML 0.9% SOD CHL FLX CONT 0797208] ICU MEDICAL INC]

## (undated) DEVICE — ROCKER SWITCH PENCIL BLADE ELECTRODE, HOLSTER: Brand: EDGE

## (undated) DEVICE — SUTURE MCRYL SZ 4-0 L27IN ABSRB UD L19MM PS-2 1/2 CIR PRIM Y426H

## (undated) DEVICE — Device

## (undated) DEVICE — STRAP,POSITIONING,KNEE/BODY,FOAM,4X60": Brand: MEDLINE

## (undated) DEVICE — BNDG ELAS HK LOOP 4X5YD NS -- MATRIX

## (undated) DEVICE — SPONGE LAP 18X18IN STRL -- 5/PK

## (undated) DEVICE — 4-PORT MANIFOLD: Brand: NEPTUNE 2

## (undated) DEVICE — SURGICAL PROCEDURE PACK BASIN MAJ SET CUST NO CAUT

## (undated) DEVICE — STERILE POLYISOPRENE POWDER-FREE SURGICAL GLOVES WITH EMOLLIENT COATING: Brand: PROTEXIS

## (undated) DEVICE — BANDAGE,GAUZE,CONFORMING,3"X75",STRL,LF: Brand: MEDLINE